# Patient Record
Sex: FEMALE | Race: WHITE | Employment: OTHER | URBAN - METROPOLITAN AREA
[De-identification: names, ages, dates, MRNs, and addresses within clinical notes are randomized per-mention and may not be internally consistent; named-entity substitution may affect disease eponyms.]

---

## 2017-01-03 ENCOUNTER — CARE COORDINATION (OUTPATIENT)
Dept: SURGERY | Facility: CLINIC | Age: 72
End: 2017-01-03

## 2017-01-03 DIAGNOSIS — K81.0 ACUTE CHOLECYSTITIS: Primary | ICD-10-CM

## 2017-01-03 RX ORDER — HYDROMORPHONE HYDROCHLORIDE 2 MG/1
1-2 TABLET ORAL EVERY 4 HOURS PRN
Qty: 10 TABLET | Refills: 0 | Status: SHIPPED | OUTPATIENT
Start: 2017-01-03 | End: 2017-04-12

## 2017-01-03 NOTE — PROGRESS NOTES
RN Post Op Care Coordination Note    Ms. Patricia A Sowada is a 71 year old female who was hospitalized with cholecystitis and discharged . Spoke with Patient.    Support  Patient able to care for self independently     Health Status  Fevers/chills: Patient denies any fever or chills.  Nausea/Vomiting: Patient denies nausea/vomiting.  Did not fill Zofran Rx.  Eating/drinking: Patient is able to eat and drink without any complaints.  Discussed no fat/low fat diet.  Bowel habits: Patient reports having a normal bowel movement.  Urinary: Voiding normally  Drains (VIRGIL): N/A  Incisions: No surgery  Pain: Improving.  Taking Tylenol PRN and Dilaudid 1 mg BID PRN with good pain management.  Patients spouse passed away while she was hospitalized and wake/ this weekend.  She is asking for Dilaudid refill due to increased activity.  Antibiotic:  2 doses of Cipro remaining.    Activity/Restrictions  Patient reports return to normal activities.  Balancing rest with activity.    Equipment  other None    Pathology reviewed with patient:  N/A    All of her questions were answered.  She will call this office if she has any further questions and/or concerns.      Post hospital visit confirmed with Dr. Reyes.    Whom and When to Call  Patient acknowledges understanding of how to manage any medication changes and when to seek medical care.     Patient advised that if after hour medical concerns arise to please call [phone] and choose option 4 to speak to the physician on call.

## 2017-01-16 ENCOUNTER — TRANSFERRED RECORDS (OUTPATIENT)
Dept: HEALTH INFORMATION MANAGEMENT | Facility: CLINIC | Age: 72
End: 2017-01-16

## 2017-03-23 ENCOUNTER — TRANSFERRED RECORDS (OUTPATIENT)
Dept: HEALTH INFORMATION MANAGEMENT | Facility: CLINIC | Age: 72
End: 2017-03-23

## 2017-04-12 ENCOUNTER — OFFICE VISIT (OUTPATIENT)
Dept: FAMILY MEDICINE | Facility: CLINIC | Age: 72
End: 2017-04-12
Payer: MEDICARE

## 2017-04-12 ENCOUNTER — RADIANT APPOINTMENT (OUTPATIENT)
Dept: GENERAL RADIOLOGY | Facility: CLINIC | Age: 72
End: 2017-04-12
Attending: FAMILY MEDICINE
Payer: MEDICARE

## 2017-04-12 VITALS
HEIGHT: 68 IN | WEIGHT: 171.13 LBS | HEART RATE: 72 BPM | SYSTOLIC BLOOD PRESSURE: 134 MMHG | TEMPERATURE: 98.3 F | DIASTOLIC BLOOD PRESSURE: 84 MMHG | BODY MASS INDEX: 25.93 KG/M2

## 2017-04-12 DIAGNOSIS — K81.1 CHRONIC CHOLECYSTITIS: ICD-10-CM

## 2017-04-12 DIAGNOSIS — I10 HYPERTENSION GOAL BP (BLOOD PRESSURE) < 140/90: ICD-10-CM

## 2017-04-12 DIAGNOSIS — Z01.818 PREOP GENERAL PHYSICAL EXAM: ICD-10-CM

## 2017-04-12 DIAGNOSIS — Z01.818 PREOP GENERAL PHYSICAL EXAM: Primary | ICD-10-CM

## 2017-04-12 DIAGNOSIS — I44.4 LAFB (LEFT ANTERIOR FASCICULAR BLOCK): ICD-10-CM

## 2017-04-12 PROCEDURE — 71020 XR CHEST 2 VW: CPT

## 2017-04-12 PROCEDURE — 99214 OFFICE O/P EST MOD 30 MIN: CPT | Performed by: FAMILY MEDICINE

## 2017-04-12 PROCEDURE — 93000 ELECTROCARDIOGRAM COMPLETE: CPT | Performed by: FAMILY MEDICINE

## 2017-04-12 NOTE — MR AVS SNAPSHOT
After Visit Summary   4/12/2017    Patricia A Sowada    MRN: [id number]           Patient Information     Date Of Birth          1945        Visit Information        Provider Department      4/12/2017 11:20 AM Trae Medina DO [facility]        Today's Diagnoses     Preop general physical exam    -  1    Hypertension goal BP (blood pressure) < 140/90        LAFB (left anterior fascicular block)          Care Instructions    Get stress done as planned  I am waiting records from [facility]   Will fax paperwork when done with chart and send one copy to you as well    Before Your Surgery      Call your surgeon if there is any change in your health. This includes signs of a cold or flu (such as a sore throat, runny nose, cough, rash or fever).    Do not smoke, drink alcohol or take over the counter medicine (unless your surgeon or primary care doctor tells you to) for the 24 hours before and after surgery.    If you take prescribed drugs: Follow your doctor s orders about which medicines to take and which to stop until after surgery.    Eating and drinking prior to surgery: follow the instructions from your surgeon    Take a shower or bath the night before surgery. Use the soap your surgeon gave you to gently clean your skin. If you do not have soap from your surgeon, use your regular soap. Do not shave or scrub the surgery site.  Wear clean pajamas and have clean sheets on your bed.         Follow-ups after your visit        Your next 10 appointments already scheduled     Apr 18, 2017 11:00 AM CDT   Ech Stress Test with FKECHR1, FK STRESS RM   [facility] ([facility])    [address] [place] [id number] [phone]           1.  Please bring or wear a comfortable two-piece outfit and walking shoes. 2.  Stop eating 3 hours before the test. You may drink water or juice. 3.  Stop all caffeine 12 hours before  the test. This includes coffee, tea, soda pop, chocolate and certain medicines (such as Anacin and Excederin). Also avoid decaf coffee and tea, as these contain small amounts of caffeine. 4.  No alcohol, smoking or use of other tobacco products for 12 hours before the test. 5.  Refer to your provider instructions to see if you need to stop any medications (such as beta-blockers or nitrates) for this test. 6.  For patients with diabetes: -   If you take insulin, call your diabetes care team. Ask if you should take a   dose the morning of your test. -   If you take diabetes medicine by mouth, don't take it on the morning of your test. Bring it with you to take after the test.  (If you have questions, call your diabetes care team) 7.  When you arrive, please tell us if: -   You have diabetes. -   You have taken Viagra, Cialis or Levitra in the past 48 hours. 8.  For any questions that cannot be answered, please contact the ordering physician              Future tests that were ordered for you today     Open Future Orders        Priority Expected Expires Ordered    Exercise Stress Echocardiogram Routine  4/12/2018 4/12/2017            Who to contact     If you have questions or need follow up information about today's clinic visit or your schedule please contact [facility] directly at [phone].  Normal or non-critical lab and imaging results will be communicated to you by MyChart, letter or phone within 4 business days after the clinic has received the results. If you do not hear from us within 7 days, please contact the clinic through MyChart or phone. If you have a critical or abnormal lab result, we will notify you by phone as soon as possible.  Submit refill requests through [employer] or call your pharmacy and they will forward the refill request to us. Please allow 3 business days for your refill to be completed.          Additional Information About Your Visit        [employer] Information      "[employer]suite lets you send messages to your doctor, view your test results, renew your prescriptions, schedule appointments and more. To sign up, go to [email or web address] . Click on \"Log in\" on the left side of the screen, which will take you to the Welcome page. Then click on \"Sign up Now\" on the right side of the page.     You will be asked to enter the access code listed below, as well as some personal information. Please follow the directions to create your username and password.     Your access code is: [email or web address]  Expires: 2017  7:30 AM     Your access code will  in 90 days. If you need help or a new code, please call your [facility] clinic or [phone].        Care EveryWhere ID     This is your [facility] EveryWhere ID. This could be used by other organizations to access your [facility] medical records  [id number]        Your Vitals Were     Pulse Temperature Height BMI (Body Mass Index)          72 98.3  F (36.8  C) (Oral) 5' 8\" (1.727 m) 26.02 kg/m2         Blood Pressure from Last 3 Encounters:   17 134/84   16 133/60   16 170/86    Weight from Last 3 Encounters:   17 171 lb 2 oz (77.6 kg)   16 182 lb 4.8 oz (82.7 kg)   11/10/16 177 lb (80.3 kg)              We Performed the Following     EKG 12-lead complete w/read - Clinics          Today's Medication Changes          These changes are accurate as of: 17  1:02 PM.  If you have any questions, ask your nurse or doctor.               Stop taking these medicines if you haven't already. Please contact your care team if you have questions.     acetaminophen 325 MG tablet   Commonly known as:  TYLENOL   Stopped by:  Trae Medina DO                    Primary Care Provider Office Phone # Fax #    Trae Medina [phone]       [address] [address]        Thank you!     Thank you for choosing [facility]  for " your care. Our goal is always to provide you with excellent care. Hearing back from our patients is one way we can continue to improve our services. Please take a few minutes to complete the written survey that you may receive in the mail after your visit with us. Thank you!             Your Updated Medication List - Protect others around you: Learn how to safely use, store and throw away your medicines at www.disposemymeds.org.          This list is accurate as of: 4/12/17  1:02 PM.  Always use your most recent med list.                   Brand Name Dispense Instructions for use    aspirin 81 MG EC tablet      Take 81 mg by mouth daily       hydrochlorothiazide 12.5 MG Tabs tablet     90 tablet    Take 1 tablet (12.5 mg) by mouth daily       latanoprost 0.005 % ophthalmic solution    XALATAN     Place 1 drop into both eyes At Bedtime

## 2017-04-12 NOTE — PATIENT INSTRUCTIONS
Get stress done as planned  I am waiting records from [facility]   Will fax paperwork when done with chart and send one copy to you as well    Before Your Surgery      Call your surgeon if there is any change in your health. This includes signs of a cold or flu (such as a sore throat, runny nose, cough, rash or fever).    Do not smoke, drink alcohol or take over the counter medicine (unless your surgeon or primary care doctor tells you to) for the 24 hours before and after surgery.    If you take prescribed drugs: Follow your doctor s orders about which medicines to take and which to stop until after surgery.    Eating and drinking prior to surgery: follow the instructions from your surgeon    Take a shower or bath the night before surgery. Use the soap your surgeon gave you to gently clean your skin. If you do not have soap from your surgeon, use your regular soap. Do not shave or scrub the surgery site.  Wear clean pajamas and have clean sheets on your bed.

## 2017-04-12 NOTE — PROGRESS NOTES
[address] [id number] [phone]  Dept: [phone]    PRE-OP EVALUATION:  Today's date: 2017    Patricia A Sowada (: 1945) presents for pre-operative evaluation assessment as requested by Dr. Arora.  She requires evaluation and anesthesia risk assessment prior to undergoing surgery/procedure for treatment of gall stones ? .  Proposed procedure: gall bladder removal    Date of Surgery/ Procedure: 17  Time of Surgery/ Procedure: ?  Hospital/Surgical Facility: [facility]  Fax number for surgical facility: [phone]  Primary Physician: Trae Medina  Type of Anesthesia Anticipated: General    Patient has a Health Care Directive or Living Will:  YES      1. NO - Do you have a history of heart attack, stroke, stent, bypass or surgery on an artery in the head, neck, heart or legs?  2. NO - Do you ever have any pain or discomfort in your chest?  3. NO - Do you have a history of  Heart Failure?  4. NO - Are you troubled by shortness of breath when: walking on the level, up a slight hill or at night?  5. NO - Do you currently have a cold, bronchitis or other respiratory infection?  6. NO - Do you have a cough, shortness of breath or wheezing?  7. NO - Do you sometimes get pains in the calves of your legs when you walk?  8. NO - Do you or anyone in your family have previous history of blood clots?  9. NO - Do you or does anyone in your family have a serious bleeding problem such as prolonged bleeding following surgeries or cuts?  10. NO - Have you ever had problems with anemia or been told to take iron pills?  11. NO - Have you had any abnormal blood loss such as black, tarry or bloody stools, or abnormal vaginal bleeding?  12. NO - Have you ever had a blood transfusion?  13. YES - Have you or any of your relatives ever had problems with anesthesia?  14. NO - Do you have sleep apnea, excessive snoring or daytime drowsiness?  15.  NO - Do you have any prosthetic heart valves?  16. NO - Do you have prosthetic joints?  17. NO - Is there any chance that you may be pregnant?      HPI:                                                      Brief HPI related to upcoming procedure: history of acute cholecystitis, was on antibiotics in 12/16, she was supposed to follow up up at the [facility] and was worsening with her pain and fever and went to [place].  Patient had percutaneous cholecystostomy placed for 3 months and is to have her gallbladder removed.    She has no fever, no chills, no abdominal pain, no cough, no sob, no chest pain, no headache, no dizziness.      no records from [facility] here to review     History of hypertension-taking meds, no previous EKG done here, no symptoms with 4 mets of exercise, EKG showing no acute problems but shows anterior fasicular Block    MEDICAL HISTORY:                                                      Patient Active Problem List    Diagnosis Date Noted     Advanced directives, counseling/discussion 10/13/2011     Priority: High     Advance Care Planning:   Receipt of ACP document:  Received: Health Care Directive which was witnessed or notarized on 2/16/13.  Document not previously scanned.  Validation form completed and sent with document to be scanned.  Code Status needs to be updated to reflect choices in most recent ACP document. Orders placed.  Confirmed/documented designated decision maker(s). See permanent comments section of demographics in clinical tab. View document(s) and details by clicking on code status.   Added by Darci Teran on 5/1/2013.         Acute cholecystitis 12/18/2016     Priority: Medium     Uterine leiomyoma, unspecified location 11/10/2015     Priority: Medium     Diverticular disease of colon 10/13/2014     Priority: Medium     Morning joint stiffness 10/24/2013     Priority: Medium     Colon polyps 10/15/2012     Priority: Medium     At next colonoscopy, needs to be done in hospital  due to anesthesia reactions.        Seborrheic keratosis 03/14/2011     Priority: Medium     (Problem list name updated by automated process. Provider to review and confirm.)       Hypertension goal BP (blood pressure) < 140/90 09/02/2010     Priority: Medium     CARDIOVASCULAR SCREENING; LDL GOAL LESS THAN 130 05/09/2010     Priority: Medium     Overweight 02/26/2010     Priority: Medium     Problem list name updated by automated process. Provider to review       Impaired fasting glucose 11/25/2008     Priority: Medium      Past Medical History:   Diagnosis Date     Bell's palsy 10/2001     COPD (chronic obstructive pulmonary disease) (H)      HTN (hypertension)      PONV (postoperative nausea and vomiting)      Past Surgical History:   Procedure Laterality Date     BUNIONECTOMY RT/LT  6/2001    right     C APPENDECTOMY  1950     C NONSPECIFIC PROCEDURE  1996    hammer toe repair, bilat     COLONOSCOPY N/A 10/9/2015    Procedure: COLONOSCOPY;  Surgeon: Aldo Hurtado MD;  Location:  GI     TUBAL LIGATION  1980     Current Outpatient Prescriptions   Medication Sig Dispense Refill     aspirin 81 MG EC tablet Take 81 mg by mouth daily       latanoprost (XALATAN) 0.005 % ophthalmic solution Place 1 drop into both eyes At Bedtime  0     hydrochlorothiazide 12.5 MG TABS Take 1 tablet (12.5 mg) by mouth daily 90 tablet 3     OTC products: None, except as noted above    Allergies   Allergen Reactions     Penicillins Rash      Latex Allergy: NO    Social History   Substance Use Topics     Smoking status: Never Smoker     Smokeless tobacco: Never Used     Alcohol use No     History   Drug Use No       REVIEW OF SYSTEMS:                                                    C: NEGATIVE for fever, chills, change in weight  I: NEGATIVE for worrisome rashes, moles or lesions  E: NEGATIVE for vision changes or irritation  E/M: NEGATIVE for ear, mouth and throat problems  R: NEGATIVE for significant cough or SOB  B:  "NEGATIVE for masses, tenderness or discharge  CV: NEGATIVE for chest pain, palpitations or peripheral edema  GI: NEGATIVE for nausea, abdominal pain, heartburn, or change in bowel habits  : NEGATIVE for frequency, dysuria, or hematuria  M: NEGATIVE for significant arthralgias or myalgia  N: NEGATIVE for weakness, dizziness or paresthesias  E: NEGATIVE for temperature intolerance, skin/hair changes  H: NEGATIVE for bleeding problems  P: NEGATIVE for changes in mood or affect    EXAM:                                                    /84 (BP Location: Right arm, Cuff Size: Adult Large)  Pulse 72  Temp 98.3  F (36.8  C) (Oral)  Ht 5' 8\" (1.727 m)  Wt 171 lb 2 oz (77.6 kg)  BMI 26.02 kg/m2    GENERAL APPEARANCE: healthy, alert and no distress     EYES: EOMI, PERRL     HENT: ear canals and TM's normal and nose and mouth without ulcers or lesions     NECK: no adenopathy, no asymmetry, masses, or scars and thyroid normal to palpation     RESP: lungs clear to auscultation - no rales, rhonchi or wheezes     CV: regular rates and rhythm, normal S1 S2, no S3 or S4 and no murmur, click or rub     ABDOMEN:  soft, nontender, no HSM or masses and bowel sounds normal     MS: extremities normal- no gross deformities noted, no evidence of inflammation in joints, FROM in all extremities.     SKIN: no suspicious lesions or rashes     NEURO: Normal strength and tone, sensory exam grossly normal, mentation intact and speech normal    DIAGNOSTICS:                                                    EKG: appears normal, NSR, no previous EKG , anterior fascicular block    Recent Labs   Lab Test  12/25/16   0720  12/24/16   0738   04/02/15   0920   02/12/09   0732   HGB  12.4  13.2   < >  15.0   < >   --    PLT  340  311   < >   --    --    --    NA  139  139   < >  142   < >   --    POTASSIUM  3.8  3.7   < >  4.0   < >   --    CR  0.61  0.62   < >  0.64   < >   --    A1C   --    --    --   5.4   --   5.3    < > = values in this " interval not displayed.        IMPRESSION:                                                    Reason for surgery/procedure: gallbladder removal  Diagnosis/reason for consult: preoperative visit    The proposed surgical procedure is considered INTERMEDIATE risk.    REVISED CARDIAC RISK INDEX  The patient has the following serious cardiovascular risks for perioperative complications such as (MI, PE, VFib and 3  AV Block):  No serious cardiac risks  INTERPRETATION: 0 risks: Class I (very low risk - 0.4% complication rate)    The patient has the following additional risks for perioperative complications:  No identified additional risks      ICD-10-CM    1. Preop general physical exam Z01.818 EKG 12-lead complete w/read - Clinics     XR Chest 2 Views     CANCELED: EKG 12-lead complete w/read - Clinics   2. Chronic cholecystitis K81.1    3. Hypertension goal BP (blood pressure) < 140/90 I10 XR Chest 2 Views     Exercise Stress Echocardiogram   4. LAFB (left anterior fascicular block) I44.4 Exercise Stress Echocardiogram     hypertension-stable, cont meds  Chronic cholecystitis up with surgery as planned, patient declined labs today as she reports that she had it at [facility] few days ago and was told it was normal.  Chest x-ray neg  EKG changes-stress echo advised    RECOMMENDATIONS:                                                      --Consult hospital rounder / IM to assist post-op medical management    --Patient is to take all scheduled medications on the day of surgery EXCEPT for modifications listed below.    APPROVAL GIVEN to proceed with proposed procedure, without further diagnostic evaluation       Signed Electronically by: Trae Medina DO    Copy of this evaluation report is provided to requesting physician.    [facility] Preop Guidelines

## 2017-04-12 NOTE — NURSING NOTE
"Chief Complaint   Patient presents with     Pre-Op Exam       Initial /84 (BP Location: Right arm, Cuff Size: Adult Large)  Pulse 72  Temp 98.3  F (36.8  C) (Oral)  Ht 5' 8\" (1.727 m)  Wt 171 lb 2 oz (77.6 kg)  BMI 26.02 kg/m2 Estimated body mass index is 26.02 kg/(m^2) as calculated from the following:    Height as of this encounter: 5' 8\" (1.727 m).    Weight as of this encounter: 171 lb 2 oz (77.6 kg).  Medication Reconciliation: rhonda UNGER, Certified Medical Assistant (AAMA)April 12, 2017 11:14 AM      "

## 2017-04-17 ENCOUNTER — TELEPHONE (OUTPATIENT)
Dept: FAMILY MEDICINE | Facility: CLINIC | Age: 72
End: 2017-04-17

## 2017-04-18 ENCOUNTER — TELEPHONE (OUTPATIENT)
Dept: FAMILY MEDICINE | Facility: CLINIC | Age: 72
End: 2017-04-18

## 2017-04-18 ENCOUNTER — RADIANT APPOINTMENT (OUTPATIENT)
Dept: CARDIOLOGY | Facility: CLINIC | Age: 72
End: 2017-04-18
Attending: FAMILY MEDICINE
Payer: MEDICARE

## 2017-04-18 DIAGNOSIS — I10 HYPERTENSION GOAL BP (BLOOD PRESSURE) < 140/90: ICD-10-CM

## 2017-04-18 DIAGNOSIS — I44.4 LAFB (LEFT ANTERIOR FASCICULAR BLOCK): ICD-10-CM

## 2017-04-18 PROCEDURE — 40000264 ECHO STRESS TEST WITH DEFINITY: Performed by: INTERNAL MEDICINE

## 2017-04-18 PROCEDURE — 93321 DOPPLER ECHO F-UP/LMTD STD: CPT | Mod: TC | Performed by: INTERNAL MEDICINE

## 2017-04-18 PROCEDURE — 93350 STRESS TTE ONLY: CPT | Mod: 26 | Performed by: INTERNAL MEDICINE

## 2017-04-18 PROCEDURE — 93017 CV STRESS TEST TRACING ONLY: CPT | Performed by: INTERNAL MEDICINE

## 2017-04-18 PROCEDURE — 93352 ADMIN ECG CONTRAST AGENT: CPT | Performed by: INTERNAL MEDICINE

## 2017-04-18 PROCEDURE — 93016 CV STRESS TEST SUPVJ ONLY: CPT | Performed by: INTERNAL MEDICINE

## 2017-04-18 PROCEDURE — 93018 CV STRESS TEST I&R ONLY: CPT | Performed by: INTERNAL MEDICINE

## 2017-04-18 PROCEDURE — 93325 DOPPLER ECHO COLOR FLOW MAPG: CPT | Mod: 26 | Performed by: INTERNAL MEDICINE

## 2017-04-18 PROCEDURE — 93325 DOPPLER ECHO COLOR FLOW MAPG: CPT | Mod: TC | Performed by: INTERNAL MEDICINE

## 2017-04-18 PROCEDURE — 93321 DOPPLER ECHO F-UP/LMTD STD: CPT | Mod: 26 | Performed by: INTERNAL MEDICINE

## 2017-04-18 PROCEDURE — 93350 STRESS TTE ONLY: CPT | Mod: TC | Performed by: INTERNAL MEDICINE

## 2017-04-18 RX ADMIN — Medication 6 ML: at 11:15

## 2017-04-18 NOTE — TELEPHONE ENCOUNTER
Attempt # 1    Called patient at home number.     Was call answered?  No.  Left message on voicemail with information to call me back.    Kelechi Luong RN

## 2017-04-18 NOTE — TELEPHONE ENCOUNTER
Call and notify patient her stress test is normal. The results should also be sent with her preop to [facility].     See recent encounters that may have sent the preop already. Unsure if stress test was sent. If not please send.     Avtar Mccracken MD

## 2017-04-19 NOTE — TELEPHONE ENCOUNTER
Faxed stress test results to [facility] at [phone].    Pre op had already been faxed.    Denise Simental

## 2017-04-19 NOTE — TELEPHONE ENCOUNTER
Called and spoke to  Myla, relayed message.     Team please fax this to [facility].     Hetal Buchanan RN

## 2017-06-28 ENCOUNTER — TELEPHONE (OUTPATIENT)
Dept: FAMILY MEDICINE | Facility: CLINIC | Age: 72
End: 2017-06-28

## 2017-06-28 NOTE — TELEPHONE ENCOUNTER
Patricia A Sowada is a 72 year old female who calls with complaint of blood in her urine..    NURSING ASSESSMENT:  Description:  Noticed a little blood in her urine this morning. Has not had any blood in her urine this afternoon.  Onset/duration:  This morning  Precip. factors:  Had a davis catheter in for her surgery on 6/23/17  Associated symptoms:  Denies urgency, pain, or fever.  Improves/worsens symptoms:  Drinking water helps.  Pain scale (0-10)   0/10  LMP/preg/breast feeding:  na  Last exam/Treatment:  4/12/17  Allergies:   Allergies   Allergen Reactions     Penicillins Rash       MEDICATIONS:   Taking medication(s) as prescribed? Yes  Taking over the counter medication(s?) No  Any medication side effects? Not Applicable    Any barriers to taking medication(s) as prescribed?  No  Medication(s) improving/managing symptoms?  N/A  Medication reconciliation completed: Yes      NURSING PLAN: Nursing advice to patient Increase fluid intake. Drink some cranberry juice also. If symtoms do not improve or if you develope pain or fever call to schedule an appointment with a provider.    RECOMMENDED DISPOSITION:  Home care advice - see above  Will comply with recommendation: Yes  If further questions/concerns or if symptoms do not improve, worsen or new symptoms develop, call your PCP or [facility] as soon as possible.      Guideline used:  Telephone Triage Protocols for Nurses, Fifth Edition, Carmelita Salcedo RN

## 2017-06-28 NOTE — TELEPHONE ENCOUNTER
Reason for call:  Patient reporting a symptom    Symptom or request: blood in urine, patient had wrist surgery at [facility] on 6/23 in the AM, patient states they emptied her bladder. Patient states today she had blood in urine, was told to call PCP.    Duration (how long have symptoms been present): x 1 today    Have you been treated for this before? Yes    Additional comments:     Phone Number patient can be reached at:  Home number on file [phone] (home)    Best Time:  any    Can we leave a detailed message on this number:  YES    Call taken on 6/28/2017 at 12:35 PM by Laura Abdi

## 2017-08-02 ENCOUNTER — THERAPY VISIT (OUTPATIENT)
Dept: OCCUPATIONAL THERAPY | Facility: CLINIC | Age: 72
End: 2017-08-02
Payer: MEDICARE

## 2017-08-02 DIAGNOSIS — Z47.89 AFTERCARE FOLLOWING SURGERY OF THE MUSCULOSKELETAL SYSTEM: ICD-10-CM

## 2017-08-02 DIAGNOSIS — S52.502D CLOSED FRACTURE OF DISTAL END OF LEFT RADIUS WITH ROUTINE HEALING, UNSPECIFIED FRACTURE MORPHOLOGY, SUBSEQUENT ENCOUNTER: ICD-10-CM

## 2017-08-02 DIAGNOSIS — M25.632 STIFFNESS OF LEFT WRIST JOINT: Primary | ICD-10-CM

## 2017-08-02 PROCEDURE — G8988 SELF CARE GOAL STATUS: HCPCS | Mod: GO | Performed by: OCCUPATIONAL THERAPIST

## 2017-08-02 PROCEDURE — 97110 THERAPEUTIC EXERCISES: CPT | Mod: GO | Performed by: OCCUPATIONAL THERAPIST

## 2017-08-02 PROCEDURE — G8987 SELF CARE CURRENT STATUS: HCPCS | Mod: GO | Performed by: OCCUPATIONAL THERAPIST

## 2017-08-02 PROCEDURE — 97165 OT EVAL LOW COMPLEX 30 MIN: CPT | Mod: GO | Performed by: OCCUPATIONAL THERAPIST

## 2017-08-02 PROCEDURE — 97140 MANUAL THERAPY 1/> REGIONS: CPT | Mod: GO | Performed by: OCCUPATIONAL THERAPIST

## 2017-08-02 NOTE — MR AVS SNAPSHOT
After Visit Summary   8/2/2017    Patricia A Sowada    MRN: [id number]           Patient Information     Date Of Birth          1945        Visit Information        Provider Department      8/2/2017 1:00 PM Ifrah Preciado [facility] [place]        Today's Diagnoses     Stiffness of left wrist joint    -  1    Closed fracture of distal end of left radius with routine healing, unspecified fracture morphology, subsequent encounter        Aftercare following surgery of the musculoskeletal system           Follow-ups after your visit        Your next 10 appointments already scheduled     Aug 08, 2017 10:00 AM CDT   SUDHAKAR Hand with Ifrah Preciado   [facility] [place] ([facility] [place] [facility])    [address]  [place] [address]   [phone]            Aug 10, 2017 10:00 AM CDT   SUDHAKAR Hand with Ifrah Preciado   [facility] [place] ([facility] [place] [facility])    [address]  [address]  [place] [address]   [phone]            Aug 14, 2017 11:30 AM CDT   SUDHAKAR Hand with Ifrah Preciado   [facility] [place] ([facility] [place] [facility])    [address]  [address]  [place] [address]   [phone]            Aug 17, 2017 11:30 AM CDT   SUDHAKAR Hand with Ifrah Preciado   [facility] [place] ([facility] [place] [facility])    [address]  [place] [address]   [phone]            Aug 28, 2017  7:30 AM CDT   SUDHAKAR Hand with Nicolle Pastor, OT   [facility] [place] ([facility] [place] [facility])    [address]  [place] [address]   [phone]            Aug 31, 2017  7:30 AM CDT   SUDHAKAR Hand with Nicolle Pastor, OT   [facility] [place] ([facility] [place] [facility])    [address]  [address]  [place]  "[id number] [phone]              Who to contact     If you have questions or need follow up information about today's clinic visit or your schedule please contact [facility] directly at [phone].  Normal or non-critical lab and imaging results will be communicated to you by MyChart, letter or phone within 4 business days after the clinic has received the results. If you do not hear from us within 7 days, please contact the clinic through MyChart or phone. If you have a critical or abnormal lab result, we will notify you by phone as soon as possible.  Submit refill requests through [employer] or call your pharmacy and they will forward the refill request to us. Please allow 3 business days for your refill to be completed.          Additional Information About Your Visit        Jajahhart Information     [employer] lets you send messages to your doctor, view your test results, renew your prescriptions, schedule appointments and more. To sign up, go to [email or web address] . Click on \"Log in\" on the left side of the screen, which will take you to the Welcome page. Then click on \"Sign up Now\" on the right side of the page.     You will be asked to enter the access code listed below, as well as some personal information. Please follow the directions to create your username and password.     Your access code is: [email or web address]  Expires: 10/31/2017  3:28 PM     Your access code will  in 90 days. If you need help or a new code, please call your [place] clinic or [phone].        Care EveryWhere ID     This is your Care EveryWhere ID. This could be used by other organizations to access your [place] medical records  [id number]         Blood Pressure from Last 3 Encounters:   17 134/84   16 133/60   16 170/86    Weight from Last 3 Encounters:   17 77.6 kg (171 lb 2 oz)   16 82.7 kg (182 lb 4.8 oz)   11/10/16 80.3 kg (177 lb)            "   We Performed the Following     HC OT EVAL, LOW COMPLEXITY     SUDHAKAR CERT REPORT     SUDHAKAR INITIAL EVAL REPORT     MANUAL THER TECH,1+REGIONS,EA 15 MIN     THERAPEUTIC EXERCISES        Primary Care Provider Office Phone # Fax #    Trae Medina [phone]       [facility] [address] [address]        Equal Access to Services     LIZETT JOSEPH : Hadii aad ku hadasho Soomaali, waaxda luqadaha, qaybta kaalmada adeegyada, waxay idiin hayaan adeeg kharash la'aan ah. So [facility] [phone].    ATENCIÓN: Si habla español, tiene a espinosa disposición servicios gratuitos de asistencia lingüística. Tyroneame al [phone].    We comply with applicable federal civil rights laws and [place] laws. We do not discriminate on the basis of race, color, national origin, age, disability sex, sexual orientation or gender identity.            Thank you!     Thank you for choosing [facility]  for your care. Our goal is always to provide you with excellent care. Hearing back from our patients is one way we can continue to improve our services. Please take a few minutes to complete the written survey that you may receive in the mail after your visit with us. Thank you!             Your Updated Medication List - Protect others around you: Learn how to safely use, store and throw away your medicines at www.disposemymeds.org.          This list is accurate as of: 8/2/17  3:28 PM.  Always use your most recent med list.                   Brand Name Dispense Instructions for use Diagnosis    aspirin 81 MG EC tablet      Take 81 mg by mouth daily        hydrochlorothiazide 12.5 MG Tabs tablet     90 tablet    Take 1 tablet (12.5 mg) by mouth daily    Hypertension goal BP (blood pressure) < 140/90       latanoprost 0.005 % ophthalmic solution    XALATAN     Place 1 drop into both eyes At Bedtime

## 2017-08-02 NOTE — LETTER
DEPARTMENT OF HEALTH AND HUMAN SERVICES  CENTERS FOR MEDICARE & MEDICAID SERVICES    PLAN/UPDATED PLAN OF PROGRESS FOR OUTPATIENT REHABILITATION    PATIENTS NAME:  Sowada, Patricia   : 1945  PROVIDER NUMBER:  [id number]  [facility] [id number]  PROVIDER NAME: [facility]  MEDICAL RECORD NUMBER: [id number]   START OF CARE DATE:    SOC Date: 17   TYPE:  OT  PRIMARY/TREATMENT DIAGNOSIS: (Pertinent Medical Diagnosis)  Stiffness of left wrist joint  Closed fracture of distal end of left radius with routine healing, unspecified fracture morphology, subsequent encounter  Aftercare following surgery of the musculoskeletal system  VISITS FROM START OF CARE:  Rxs Used: 1     Hand Therapy Initial Evaluation  Current Date:  2017    Subjective:  Patricia A Sowada is a 72 year old right hand dominant female.  Diagnosis:   Left distal radius fracture  DOI:  17  DOS:  17  Procedure:  ORIF  Post:  5w 5d  Next MD f/u 17; Dr. Elias at [place]  Patient reports symptoms of pain, stiffness/loss of motion, weakness/loss of strength and edema of the left hand and wrist which occurred due to FOOSH when missed step on ladder. Since onset symptoms are gradually getting better.  Special tests:  x-ray.  Previous treatment: Casted until 17 now munster orthosis.  General health as reported by patient is excellent.  Pertinent medical history includes:none  Medical allergies:Penicillins.  Surgical history: orthopedic: recent wrist ORIF.  Medication history: Hydrochlorot, Latanoprost.    Occupational Profile Information:  Current occupation is Retired   Currently not working due to school not being in session  Prior functional level:  no limitations  Barriers include:none  Mobility: No difficulty  Transportation: drives  Leisure activities/hobbies: Reading, play piano, knitting and crocheting   Other: Subsitute teacher   Functional Outcome Measure:  Upper Extremity Functional  Index  SCORE:   Column Totals:   (A lower score indicates greater disability.)      PATIENTS NAME:  Sowada, Patricia   : 1945    ROM:  Mild limitation of fingers to DPC, 2 cm  Thumb  17   AROM(PROM) Right Left   MP /40 /40   IP /50 /12   PAbd 40 35   RAbd 35 25     Wrist  17   AROM(PROM) Right Left   Extension 65 25   Flexion 75 15   RD 20 5   UD 35 10   Supination 80 35   Pronation 80 60   Strength:   (Measured in pounds)    17   Trials Right Left   1  2  3 48  50  45 10  8  8   Average: 48 9     Lat Pinch  17   Trials Right Left   1  2  3 13  14  13 4  4  4   Average: 13 4     3 Pt Pinch  17   Trials Right Left   1  2  3 11  10  10 2  3  3   Average: 10 3   Edema:  Circumference:  (Measured in cm)  Wrist Crease 17   Right 15.2   Left 17.2   Scar:  Sensitivity: Mild Quality:  Moderate  Sensation:  WNL throughout all nerve distributions; per patient report  Pain Report:  VAS(0-10) 17   At Rest: 0/10   With Use: 3/10   Location:  Ulnar styloid  Pain Quality:  Dull and pressure  Frequency: intermittent    PATIENTS NAME:  Sowada, Patricia   : 1945  Pain is worst:  daytime  Exacerbated by:  Dependent position, pressure to bone  Relieved by:  Remove splint  Progression:  Gradually improving  Assessment:  Patient presents with symptoms consistent with diagnosis of distal radius fracture, with surgical intervention. Patient's limitations or Problem List includes:  Pain, Decreased ROM/motion, Increased edema, Weakness, Adherent scarring, Decreased  and Decreased pinch of the left wrist and hand which interferes with the patient's ability to perform Self Care Tasks (dressing, eating, bathing, hygiene/toileting), Work Tasks, Sleep Patterns, Recreational Activities, Household Chores and Driving  as compared to previous level of function.Rehab Potential:  Good - Return to full activity, some limitationsPatient will benefit from skilled Occupational Therapy to increase  ROM, flexibility, overall strength,  strength and pinch strength and decrease pain, edema and adherence of scarring to return to previous activity level and resume normal daily tasks and to reach their rehab potential.  Barriers to Learning:  No barrier  Communication Issues:  Patient appears to be able to clearly communicate and understand verbal and written communication and follow directions correctly.  Chart Review: Chart Review, Brief history including review of medical and/or therapy records relating to the presenting problem and Detailed history review with patient  Identified Performance Deficits: bathing/showering, toileting, dressing, feeding, hygiene and grooming, driving and community mobility, home establishment and management, meal preparation and cleanup, shopping, sleep, work and leisure activities    Assessment of Occupational Performance:  5 or more Performance Deficits  Clinical Decision Making (Complexity): Low complexity  Treatment Explanation:  The following has been discussed with the patient:  RX ordered/plan of care  Anticipated outcomes  Possible risks and side effects    Plan:  Frequency:  2 X week, once daily  Duration:  for 2 weeks tapering to 1 X a week over 6 weeks  Treatment Plan:      Modalities:    US and Paraffin   Therapeutic Exercise:   AROM of fingers, thumb, wrist and forearm  PROM of wrist E/F and P/S   Overhead fisting to control edema  Progress to  and Pinch strengthening  Progress to Wrist Isotonic strengthening  Manual Techniques:  PATIENTS NAME:  Sowada, Patricia   : 1945   Scar mobilization  Joint mobilization techniques   Manual Edema Mobilization   Kinesiotape  Orthotic Fabrication:    Forearm based munster orthosis (fit at [place] 2 weeks ago)    Discharge Plan:    Achieve all LTG.  Independent in home treatment program.  Reach maximal therapeutic benefit.  Home Program:   Warmth for stiffness  Scar mobilization circular and 3 vector  AROM fingers 3  "fists  AROM Thumb E/F and opposition  [place] orthosis, remove for exercise and hygiene  Encourage light functional use of left hand out of orthosis  Next Visit:  Add PROM for wrist and  strengthening  Progress to joint mobs and wrist isotonics         Caregiver Signature/Credentials ______________________________ Date ________      Treating Provider: Ifrah Preciado, OTR/L, CHT    I have reviewed and certified the need for these services and plan of treatment while under my care.        PHYSICIAN'S SIGNATURE:   _________________________________________  Date___________    Rizwan Elias    Certification period: Beginning of Cert date period: 08/02/17 End of Cert period date: 10/30/17     Functional Level Progress Report: Please see attached \"Goal Flow sheet for Functional level.\"    ___X_____ Continue Services or       ________ DC Services                Service dates: SOC Date: 08/02/17  to present                                                                     "

## 2017-08-02 NOTE — PROGRESS NOTES
Hand Therapy Initial Evaluation    Current Date:  8/2/2017    Subjective:  Patricia A Sowada is a 72 year old right hand dominant female.    Diagnosis:   Left distal radius fracture  DOI:  6/17/17  DOS:  6/23/17  Procedure:  ORIF  Post:  5w 5d  Next MD f/u 8/30/17; Dr. Elias at [place]    Patient reports symptoms of pain, stiffness/loss of motion, weakness/loss of strength and edema of the left hand and wrist which occurred due to FOOSH when missed step on ladder. Since onset symptoms are gradually getting better.  Special tests:  x-ray.  Previous treatment: Casted until 7/20/17 now munster orthosis.  General health as reported by patient is excellent.  Pertinent medical history includes:none  Medical allergies:Penicillins.  Surgical history: orthopedic: recent wrist ORIF.  Medication history: Hydrochlorot, Latanoprost.    Occupational Profile Information:  Current occupation is Retired   Currently not working due to school not being in session  Prior functional level:  no limitations  Barriers include:none  Mobility: No difficulty  Transportation: drives  Leisure activities/hobbies: Reading, play piano, knitting and crocheting   Other: Subsitute teacher     Functional Outcome Measure:  Upper Extremity Functional Index  SCORE:   Column Totals: 11/80  (A lower score indicates greater disability.)    ROM:  Mild limitation of fingers to DPC, 2 cm  Thumb  8/2/17   AROM(PROM) Right Left   MP /40 /40   IP /50 /12   PAbd 40 35   RAbd 35 25     Wrist  8/2/17   AROM(PROM) Right Left   Extension 65 25   Flexion 75 15   RD 20 5   UD 35 10   Supination 80 35   Pronation 80 60     Strength:   (Measured in pounds)    8/2/17   Trials Right Left   1  2  3 48  50  45 10  8  8   Average: 48 9     Lat Pinch  8/2/17   Trials Right Left   1  2  3 13  14  13 4  4  4   Average: 13 4     3 Pt Pinch  8/2/17   Trials Right Left   1  2  3 11  10  10 2  3  3   Average: 10 3     Edema:  Circumference:  (Measured in cm)  Wrist Crease 8/2/17    Right 15.2   Left 17.2     Scar:  Sensitivity: Mild Quality:  Moderate    Sensation:  WNL throughout all nerve distributions; per patient report    Pain Report:  VAS(0-10) 8/2/17   At Rest: 0/10   With Use: 3/10   Location:  Ulnar styloid  Pain Quality:  Dull and pressure  Frequency: intermittent    Pain is worst:  daytime  Exacerbated by:  Dependent position, pressure to bone  Relieved by:  Remove splint  Progression:  Gradually improving  Assessment:  Patient presents with symptoms consistent with diagnosis of distal radius fracture, with surgical intervention.     Patient's limitations or Problem List includes:  Pain, Decreased ROM/motion, Increased edema, Weakness, Adherent scarring, Decreased  and Decreased pinch of the left wrist and hand which interferes with the patient's ability to perform Self Care Tasks (dressing, eating, bathing, hygiene/toileting), Work Tasks, Sleep Patterns, Recreational Activities, Household Chores and Driving  as compared to previous level of function.    Rehab Potential:  Good - Return to full activity, some limitations    Patient will benefit from skilled Occupational Therapy to increase ROM, flexibility, overall strength,  strength and pinch strength and decrease pain, edema and adherence of scarring to return to previous activity level and resume normal daily tasks and to reach their rehab potential.    Barriers to Learning:  No barrier    Communication Issues:  Patient appears to be able to clearly communicate and understand verbal and written communication and follow directions correctly.    Chart Review: Chart Review, Brief history including review of medical and/or therapy records relating to the presenting problem and Detailed history review with patient    Identified Performance Deficits: bathing/showering, toileting, dressing, feeding, hygiene and grooming, driving and community mobility, home establishment and management, meal preparation and cleanup, shopping,  sleep, work and leisure activities    Assessment of Occupational Performance:  5 or more Performance Deficits    Clinical Decision Making (Complexity): Low complexity    Treatment Explanation:  The following has been discussed with the patient:  RX ordered/plan of care  Anticipated outcomes  Possible risks and side effects    Plan:  Frequency:  2 X week, once daily  Duration:  for 2 weeks tapering to 1 X a week over 6 weeks    Treatment Plan:    Modalities:    US and Paraffin   Therapeutic Exercise:   AROM of fingers, thumb, wrist and forearm  PROM of wrist E/F and P/S   Overhead fisting to control edema  Progress to  and Pinch strengthening  Progress to Wrist Isotonic strengthening  Manual Techniques:   Scar mobilization  Joint mobilization techniques   Manual Edema Mobilization   Kinesiotape  Orthotic Fabrication:    Forearm based munster orthosis (fit at [place] 2 weeks ago)    Discharge Plan:    Achieve all LTG.  Independent in home treatment program.  Reach maximal therapeutic benefit.    Home Program:   Warmth for stiffness  Scar mobilization circular and 3 vector  AROM fingers 3 fists  AROM Thumb E/F and opposition  [place] orthosis, remove for exercise and hygiene  Encourage light functional use of left hand out of orthosis    Next Visit:  Add PROM for wrist and  strengthening  Progress to joint mobs and wrist isotonics

## 2017-08-08 ENCOUNTER — THERAPY VISIT (OUTPATIENT)
Dept: OCCUPATIONAL THERAPY | Facility: CLINIC | Age: 72
End: 2017-08-08
Payer: MEDICARE

## 2017-08-08 DIAGNOSIS — M25.632 STIFFNESS OF LEFT WRIST JOINT: ICD-10-CM

## 2017-08-08 DIAGNOSIS — S52.502D CLOSED FRACTURE OF DISTAL END OF LEFT RADIUS WITH ROUTINE HEALING, UNSPECIFIED FRACTURE MORPHOLOGY, SUBSEQUENT ENCOUNTER: ICD-10-CM

## 2017-08-08 DIAGNOSIS — Z47.89 AFTERCARE FOLLOWING SURGERY OF THE MUSCULOSKELETAL SYSTEM: ICD-10-CM

## 2017-08-08 PROCEDURE — 97140 MANUAL THERAPY 1/> REGIONS: CPT | Mod: GO | Performed by: OCCUPATIONAL THERAPIST

## 2017-08-08 PROCEDURE — 97110 THERAPEUTIC EXERCISES: CPT | Mod: GO | Performed by: OCCUPATIONAL THERAPIST

## 2017-08-08 NOTE — MR AVS SNAPSHOT
After Visit Summary   8/8/2017    Patricia A Sowada    MRN: [id number]           Patient Information     Date Of Birth          1945        Visit Information        Provider Department      8/8/2017 10:00 AM Ifrah Preciado [facility] [place]        Today's Diagnoses     Stiffness of left wrist joint        Closed fracture of distal end of left radius with routine healing, unspecified fracture morphology, subsequent encounter        Aftercare following surgery of the musculoskeletal system           Follow-ups after your visit        Your next 10 appointments already scheduled     Aug 10, 2017 10:00 AM CDT   SUDHAKAR Hand with Ifrah Preciado   [facility] [place] ([facility] [place] [facility])    [address]  [place] [address]   [phone]            Aug 14, 2017 11:30 AM CDT   SUDHAKAR Hand with Ifrah Preciado   [facility] [place] ([facility] [place] [facility])    [address]  [address]  [place] [address]   [phone]            Aug 17, 2017 11:30 AM CDT   SUDHAKAR Hand with Ifrah Preciado   [facility] [place] ([facility] [place] [facility])    [address]  [address]  [place] [address]   [phone]            Aug 28, 2017  7:30 AM CDT   SUDHAKAR Hand with Nicolle Pastor, OT   [facility] [place] ([facility] [place] [facility])    [address]  [address]  [place] [address]   [phone]            Aug 31, 2017  7:30 AM CDT   SUDHAKAR Hand with Nicolle Pastor, OT   [facility] [place] ([facility] [place] [facility])    [address]  [address]  [place] [address]   [phone]              Who to contact     If you have questions or need follow up information about today's clinic visit or your schedule please contact [facility] [place]  "directly at [phone].  Normal or non-critical lab and imaging results will be communicated to you by MyChart, letter or phone within 4 business days after the clinic has received the results. If you do not hear from us within 7 days, please contact the clinic through [employer] or phone. If you have a critical or abnormal lab result, we will notify you by phone as soon as possible.  Submit refill requests through [employer] or call your pharmacy and they will forward the refill request to us. Please allow 3 business days for your refill to be completed.          Additional Information About Your Visit        General Fusion[employer]ickr Information     [employer]ug lets you send messages to your doctor, view your test results, renew your prescriptions, schedule appointments and more. To sign up, go to [email or web address] . Click on \"Log in\" on the left side of the screen, which will take you to the Welcome page. Then click on \"Sign up Now\" on the right side of the page.     You will be asked to enter the access code listed below, as well as some personal information. Please follow the directions to create your username and password.     Your access code is: [email or web address]  Expires: 10/31/2017  3:28 PM     Your access code will  in 90 days. If you need help or a new code, please call your [place] clinic or [phone].        Care EveryWhere ID     This is your Care EveryWhere ID. This could be used by other organizations to access your [place] medical records  [id number]         Blood Pressure from Last 3 Encounters:   17 134/84   16 133/60   16 170/86    Weight from Last 3 Encounters:   17 77.6 kg (171 lb 2 oz)   16 82.7 kg (182 lb 4.8 oz)   11/10/16 80.3 kg (177 lb)              We Performed the Following     MANUAL THER TECH,1+REGIONS,EA 15 MIN     THERAPEUTIC EXERCISES        Primary Care Provider Office Phone # Fax #    Trae Cortessamson Villarsharifa  [phone]       [facility] [place] [address] [address]        Equal Access to Services     LIZETT JOSEPH : Hadii aad ku hadanisabeckie Sozhanna, warebeccada lujayant, qajessie stewartmageorgi whyte, mini dykesmarcelatanner serra. So [facility] [phone].    ATENCIÓN: Si habla español, tiene a espinosa disposición servicios gratuitos de asistencia lingüística. Llame al [phone].    We comply with applicable federal civil rights laws and [place] laws. We do not discriminate on the basis of race, color, national origin, age, disability sex, sexual orientation or gender identity.            Thank you!     Thank you for choosing [facility]  for your care. Our goal is always to provide you with excellent care. Hearing back from our patients is one way we can continue to improve our services. Please take a few minutes to complete the written survey that you may receive in the mail after your visit with us. Thank you!             Your Updated Medication List - Protect others around you: Learn how to safely use, store and throw away your medicines at www.disposemymeds.org.          This list is accurate as of: 8/8/17 10:44 AM.  Always use your most recent med list.                   Brand Name Dispense Instructions for use Diagnosis    aspirin 81 MG EC tablet      Take 81 mg by mouth daily        hydrochlorothiazide 12.5 MG Tabs tablet     90 tablet    Take 1 tablet (12.5 mg) by mouth daily    Hypertension goal BP (blood pressure) < 140/90       latanoprost 0.005 % ophthalmic solution    XALATAN     Place 1 drop into both eyes At Bedtime

## 2017-08-08 NOTE — PROGRESS NOTES
SOAP note objective information for 8/8/2017:    Diagnosis:   Left distal radius fracture  DOI:  6/17/17  DOS:  6/23/17  Procedure:  ORIF  Post:  6w 4d    ROM:  Wrist  8/2/17 8/8/17   AROM(PROM) Right Left Left   Extension 65 25 30 (35)   Flexion 75 15 25 (30)   RD 20 5    UD 35 10    Supination 80 35 45 (50)   Pronation 80 60 65     Home Program:   Warmth for stiffness  Scar mobilization circular and 3 vector  AROM fingers 3 fists  AROM Thumb E/F and opposition  Thumb IP blocking exercises  AROM wrist and forearm all planes  Gentle PROM wrist E/F and supination  [place] orthosis, taper use as tolerated  Encourage light functional use of left hand    Next Visit:  Continue 1-2x/week with scar/joint mobs and A/PROM  Assess response to PROM wrist  Add  strengthening  Progress to wrist isotonics as able    Please refer to the daily flowsheet for treatment today, total treatment time and time spent performing 1:1 timed codes.

## 2017-08-10 ENCOUNTER — THERAPY VISIT (OUTPATIENT)
Dept: OCCUPATIONAL THERAPY | Facility: CLINIC | Age: 72
End: 2017-08-10
Payer: MEDICARE

## 2017-08-10 DIAGNOSIS — M25.632 STIFFNESS OF LEFT WRIST JOINT: ICD-10-CM

## 2017-08-10 DIAGNOSIS — Z47.89 AFTERCARE FOLLOWING SURGERY OF THE MUSCULOSKELETAL SYSTEM: ICD-10-CM

## 2017-08-10 DIAGNOSIS — S52.502D CLOSED FRACTURE OF DISTAL END OF LEFT RADIUS WITH ROUTINE HEALING, UNSPECIFIED FRACTURE MORPHOLOGY, SUBSEQUENT ENCOUNTER: ICD-10-CM

## 2017-08-10 PROCEDURE — 97110 THERAPEUTIC EXERCISES: CPT | Mod: GO | Performed by: OCCUPATIONAL THERAPIST

## 2017-08-10 PROCEDURE — 97140 MANUAL THERAPY 1/> REGIONS: CPT | Mod: GO | Performed by: OCCUPATIONAL THERAPIST

## 2017-08-10 NOTE — PROGRESS NOTES
HEP Additions:  PROM wrist RD/UD   strengthening with yellow foam wedge    Please refer to the daily flowsheet for treatment today, total treatment time and time spent performing 1:1 timed codes.

## 2017-08-10 NOTE — MR AVS SNAPSHOT
After Visit Summary   8/10/2017    Patricia A Sowada    MRN: [id number]           Patient Information     Date Of Birth          1945        Visit Information        Provider Department      8/10/2017 10:00 AM Ifrah Preciado [facility] [place]        Today's Diagnoses     Stiffness of left wrist joint        Closed fracture of distal end of left radius with routine healing, unspecified fracture morphology, subsequent encounter        Aftercare following surgery of the musculoskeletal system           Follow-ups after your visit        Your next 10 appointments already scheduled     Aug 17, 2017 11:30 AM CDT   SUDHAKAR Hand with Ifrah Preciado   [facility] [place] ([facility] [place] [facility])    [address]  [place] [address]   [phone]            Aug 23, 2017 10:00 AM CDT   SUDHAKAR Hand with Ifrah Preciado   [facility] [place] ([facility] [place] [facility])    [address]  [place] [address]   [phone]            Aug 31, 2017  7:30 AM CDT   SUDHAKAR Hand with Nicolle Pastor OT   [facility] [place] ([facility] [place] [facility])    [address]  [place] [address]   [phone]              Who to contact     If you have questions or need follow up information about today's clinic visit or your schedule please contact [facility] [place] directly at [phone].  Normal or non-critical lab and imaging results will be communicated to you by MyChart, letter or phone within 4 business days after the clinic has received the results. If you do not hear from us within 7 days, please contact the clinic through Bluedhart or phone. If you have a critical or abnormal lab result, we will notify you by phone as soon as possible.  Submit refill requests through [employer] or call your pharmacy and  "they will forward the refill request to us. Please allow 3 business days for your refill to be completed.          Additional Information About Your Visit        MyChart Information     PushSpring lets you send messages to your doctor, view your test results, renew your prescriptions, schedule appointments and more. To sign up, go to [email or web address] . Click on \"Log in\" on the left side of the screen, which will take you to the Welcome page. Then click on \"Sign up Now\" on the right side of the page.     You will be asked to enter the access code listed below, as well as some personal information. Please follow the directions to create your username and password.     Your access code is: [email or web address]  Expires: 10/31/2017  3:28 PM     Your access code will  in 90 days. If you need help or a new code, please call your [place] clinic or [phone].        Care EveryWhere ID     This is your [facility] EveryWhere ID. This could be used by other organizations to access your [place] medical records  [id number]         Blood Pressure from Last 3 Encounters:   17 134/84   16 133/60   16 170/86    Weight from Last 3 Encounters:   17 77.6 kg (171 lb 2 oz)   16 82.7 kg (182 lb 4.8 oz)   11/10/16 80.3 kg (177 lb)              We Performed the Following     MANUAL THER TECH,1+REGIONS,EA 15 MIN     THERAPEUTIC EXERCISES        Primary Care Provider Office Phone # Fax #    Trae Gt Medina [phone]       [facility] [facility] [address]        Equal Access to Services     LIZETT JOSEPH : Hadii marley [place], warebeccada catrina, qaybta sheldonalyrn whyte, mini serra. So [facility] [phone].    ATENCIÓN: Si habla español, tiene a espinosa disposición servicios gratuitos de asistencia lingüística. Llame al [phone].    We comply with applicable federal civil rights laws and [place] laws. We do not discriminate on the basis " of race, color, national origin, age, disability sex, sexual orientation or gender identity.            Thank you!     Thank you for choosing [place] [facility] [place]  for your care. Our goal is always to provide you with excellent care. Hearing back from our patients is one way we can continue to improve our services. Please take a few minutes to complete the written survey that you may receive in the mail after your visit with us. Thank you!             Your Updated Medication List - Protect others around you: Learn how to safely use, store and throw away your medicines at www.disposemymeds.org.          This list is accurate as of: 8/10/17 10:37 AM.  Always use your most recent med list.                   Brand Name Dispense Instructions for use Diagnosis    aspirin 81 MG EC tablet      Take 81 mg by mouth daily        hydrochlorothiazide 12.5 MG Tabs tablet     90 tablet    Take 1 tablet (12.5 mg) by mouth daily    Hypertension goal BP (blood pressure) < 140/90       latanoprost 0.005 % ophthalmic solution    XALATAN     Place 1 drop into both eyes At Bedtime

## 2017-08-17 ENCOUNTER — THERAPY VISIT (OUTPATIENT)
Dept: OCCUPATIONAL THERAPY | Facility: CLINIC | Age: 72
End: 2017-08-17
Payer: MEDICARE

## 2017-08-17 DIAGNOSIS — M25.632 STIFFNESS OF LEFT WRIST JOINT: ICD-10-CM

## 2017-08-17 DIAGNOSIS — Z47.89 AFTERCARE FOLLOWING SURGERY OF THE MUSCULOSKELETAL SYSTEM: ICD-10-CM

## 2017-08-17 DIAGNOSIS — S52.502D CLOSED FRACTURE OF DISTAL END OF LEFT RADIUS WITH ROUTINE HEALING, UNSPECIFIED FRACTURE MORPHOLOGY, SUBSEQUENT ENCOUNTER: ICD-10-CM

## 2017-08-17 PROCEDURE — 97140 MANUAL THERAPY 1/> REGIONS: CPT | Mod: GO | Performed by: OCCUPATIONAL THERAPIST

## 2017-08-17 PROCEDURE — 97110 THERAPEUTIC EXERCISES: CPT | Mod: GO | Performed by: OCCUPATIONAL THERAPIST

## 2017-08-17 NOTE — PROGRESS NOTES
SOAP note objective information for 8/17/2017:    Diagnosis:   Left distal radius fracture  DOI:  6/17/17  DOS:  6/23/17  Procedure:  ORIF  Post:  7w 6d    ROM:  Wrist  8/2/17 8/8/17 8/17/17   AROM(PROM) Right Left Left Left   Extension  After Tx 65 25 30 (35) 35 (40)  40 (45)   Flexion  After Tx 75 15 25 (30) 30 (35)  40 (45)   RD 20 5  10   UD 35 10  15   Supination 80 35 45 (50) 70   Pronation 80 60 65 70     Strength:   (Measured in pounds)    8/2/17 8/17/17   Trials Right Left Left   1  2  3 48  50  45 10  8  8 15  15  14   Average: 48 9 15     Home Program:   Warmth for stiffness  Scar mobilization circular and 3 vector  AROM fingers 3 fists  AROM Thumb E/F and opposition  Thumb IP blocking exercises  AROM wrist and forearm all planes  PROM wrist E/F, R/U and supination  Gentle  strengthening with yellow foam wedge, progress to pink as able  Wrist E/F isotonics  [place] orthosis, discontinue  Encourage light functional use of left hand    Next Visit:  Continue 1x/week with scar/joint mobs and A/PROM  Assess response to wrist isotonics  Progress Report next visit for MD f/u at [place] 8/30/17    Please refer to the daily flowsheet for treatment today, total treatment time and time spent performing 1:1 timed codes.

## 2017-08-17 NOTE — MR AVS SNAPSHOT
After Visit Summary   8/17/2017    Patricia A Sowada    MRN: [id number]           Patient Information     Date Of Birth          1945        Visit Information        Provider Department      8/17/2017 11:30 AM Ifrah Preciado [facility] [place]        Today's Diagnoses     Stiffness of left wrist joint        Closed fracture of distal end of left radius with routine healing, unspecified fracture morphology, subsequent encounter        Aftercare following surgery of the musculoskeletal system           Follow-ups after your visit        Your next 10 appointments already scheduled     Aug 23, 2017 10:00 AM CDT   SUDHAKAR Hand with Ifrah Preciado   [facility] [place] ([facility] [place] [facility])    [address]  [place] [address]   [phone]            Aug 31, 2017  7:30 AM CDT   SUDHAKAR Hand with Nicolle Pastor OT   [facility] [place] ([facility] [place] [facility])    [address]  [place] [address]   [phone]              Who to contact     If you have questions or need follow up information about today's clinic visit or your schedule please contact [facility] [place] directly at [phone].  Normal or non-critical lab and imaging results will be communicated to you by zandahart, letter or phone within 4 business days after the clinic has received the results. If you do not hear from us within 7 days, please contact the clinic through zandahart or phone. If you have a critical or abnormal lab result, we will notify you by phone as soon as possible.  Submit refill requests through [employer] or call your pharmacy and they will forward the refill request to us. Please allow 3 business days for your refill to be completed.          Additional Information About Your Visit        zandaharRemark Media Information     [employer] lets you send messages to  "your doctor, view your test results, renew your prescriptions, schedule appointments and more. To sign up, go to [email or web address] . Click on \"Log in\" on the left side of the screen, which will take you to the Welcome page. Then click on \"Sign up Now\" on the right side of the page.     You will be asked to enter the access code listed below, as well as some personal information. Please follow the directions to create your username and password.     Your access code is: [email or web address]  Expires: 10/31/2017  3:28 PM     Your access code will  in 90 days. If you need help or a new code, please call your [place] clinic or [phone].        Care EveryWhere ID     This is your Care EveryWhere ID. This could be used by other organizations to access your [place] medical records  [id number]         Blood Pressure from Last 3 Encounters:   17 134/84   16 133/60   16 170/86    Weight from Last 3 Encounters:   17 77.6 kg (171 lb 2 oz)   16 82.7 kg (182 lb 4.8 oz)   11/10/16 80.3 kg (177 lb)              We Performed the Following     MANUAL THER TECH,1+REGIONS,EA 15 MIN     THERAPEUTIC EXERCISES        Primary Care Provider Office Phone # Fax #    Trae Medina [phone]       [address] [place] [address]        Equal Access to Services     LIZETT JOSEPH : Hadii aad ku hadasho Soomaali, waaxda luqadaha, qaybta kaalmada adeegyada, mini serra. So [facility] [phone].    ATENCIÓN: Si narayanla farheen, tiene a espinosa disposición servicios gratuitos de asistencia lingüística. Llame al [phone].    We comply with applicable federal civil rights laws and [place] laws. We do not discriminate on the basis of race, color, national origin, age, disability sex, sexual orientation or gender identity.            Thank you!     Thank you for choosing [facility] [place]  for your care. Our goal " is always to provide you with excellent care. Hearing back from our patients is one way we can continue to improve our services. Please take a few minutes to complete the written survey that you may receive in the mail after your visit with us. Thank you!             Your Updated Medication List - Protect others around you: Learn how to safely use, store and throw away your medicines at www.disposemymeds.org.          This list is accurate as of: 8/17/17 12:03 PM.  Always use your most recent med list.                   Brand Name Dispense Instructions for use Diagnosis    aspirin 81 MG EC tablet      Take 81 mg by mouth daily        hydrochlorothiazide 12.5 MG Tabs tablet     90 tablet    Take 1 tablet (12.5 mg) by mouth daily    Hypertension goal BP (blood pressure) < 140/90       latanoprost 0.005 % ophthalmic solution    XALATAN     Place 1 drop into both eyes At Bedtime

## 2017-08-23 ENCOUNTER — TRANSFERRED RECORDS (OUTPATIENT)
Dept: HEALTH INFORMATION MANAGEMENT | Facility: CLINIC | Age: 72
End: 2017-08-23

## 2017-08-23 ENCOUNTER — THERAPY VISIT (OUTPATIENT)
Dept: OCCUPATIONAL THERAPY | Facility: CLINIC | Age: 72
End: 2017-08-23
Payer: MEDICARE

## 2017-08-23 DIAGNOSIS — S52.502D CLOSED FRACTURE OF DISTAL END OF LEFT RADIUS WITH ROUTINE HEALING, UNSPECIFIED FRACTURE MORPHOLOGY, SUBSEQUENT ENCOUNTER: ICD-10-CM

## 2017-08-23 DIAGNOSIS — Z47.89 AFTERCARE FOLLOWING SURGERY OF THE MUSCULOSKELETAL SYSTEM: ICD-10-CM

## 2017-08-23 DIAGNOSIS — M25.632 STIFFNESS OF LEFT WRIST JOINT: ICD-10-CM

## 2017-08-23 PROCEDURE — G8987 SELF CARE CURRENT STATUS: HCPCS | Mod: GO | Performed by: OCCUPATIONAL THERAPIST

## 2017-08-23 PROCEDURE — G8988 SELF CARE GOAL STATUS: HCPCS | Mod: GO | Performed by: OCCUPATIONAL THERAPIST

## 2017-08-23 PROCEDURE — 97140 MANUAL THERAPY 1/> REGIONS: CPT | Mod: GO | Performed by: OCCUPATIONAL THERAPIST

## 2017-08-23 PROCEDURE — 97110 THERAPEUTIC EXERCISES: CPT | Mod: GO | Performed by: OCCUPATIONAL THERAPIST

## 2017-08-23 NOTE — MR AVS SNAPSHOT
"              After Visit Summary   8/23/2017    Patricia A Sowada    MRN: [id number]           Patient Information     Date Of Birth          1945        Visit Information        Provider Department      8/23/2017 10:00 AM Ifrah Preciado [facility] [place]        Today's Diagnoses     Stiffness of left wrist joint        Closed fracture of distal end of left radius with routine healing, unspecified fracture morphology, subsequent encounter        Aftercare following surgery of the musculoskeletal system           Follow-ups after your visit        Your next 10 appointments already scheduled     Aug 31, 2017  7:30 AM CDT   SUDHAKAR Hand with Nicolle Pastor OT   [facility] [place] (SUDHAKAR [facility] [place] [facility])    [address]  [place] [address] [phone]              Who to contact     If you have questions or need follow up information about today's clinic visit or your schedule please contact [facility] [place] directly at [phone].  Normal or non-critical lab and imaging results will be communicated to you by AppSlingrhart, letter or phone within 4 business days after the clinic has received the results. If you do not hear from us within 7 days, please contact the clinic through [employer] or phone. If you have a critical or abnormal lab result, we will notify you by phone as soon as possible.  Submit refill requests through [employer] or call your pharmacy and they will forward the refill request to us. Please allow 3 business days for your refill to be completed.          Additional Information About Your Visit        [employer] Information     AlertaPhone lets you send messages to your doctor, view your test results, renew your prescriptions, schedule appointments and more. To sign up, go to [email or web address] . Click on \"Log in\" on the left side of the screen, which will take you to the " "Welcome page. Then click on \"Sign up Now\" on the right side of the page.     You will be asked to enter the access code listed below, as well as some personal information. Please follow the directions to create your username and password.     Your access code is: [email or web address]  Expires: 10/31/2017  3:28 PM     Your access code will  in 90 days. If you need help or a new code, please call your [place] clinic or [phone].        Care EveryWhere ID     This is your Care EveryWhere ID. This could be used by other organizations to access your [place] medical records  [id number]         Blood Pressure from Last 3 Encounters:   17 134/84   16 133/60   16 170/86    Weight from Last 3 Encounters:   17 77.6 kg (171 lb 2 oz)   16 82.7 kg (182 lb 4.8 oz)   11/10/16 80.3 kg (177 lb)              Today, you had the following     No orders found for display       Primary Care Provider Office Phone # Fax #    Trae Medina [phone]       [address] [address]        Equal Access to Services     LIZETT JOSEPH : Hadii marley ku hadasho Solatashaali, waaxda luqadaha, qaybta kaalmada adeegyada, mini serra. So [facility] [place] [phone].    ATENCIÓN: Si habla español, tiene a espinosa disposición servicios gratuitos de asistencia lingüística. Llame al [phone].    We comply with applicable federal civil rights laws and [place] laws. We do not discriminate on the basis of race, color, national origin, age, disability sex, sexual orientation or gender identity.            Thank you!     Thank you for choosing [place] [facility] [place]  for your care. Our goal is always to provide you with excellent care. Hearing back from our patients is one way we can continue to improve our services. Please take a few minutes to complete the written survey that you may receive in the mail after your visit with us. Thank " you!             Your Updated Medication List - Protect others around you: Learn how to safely use, store and throw away your medicines at www.disposemymeds.org.          This list is accurate as of: 8/23/17 10:13 AM.  Always use your most recent med list.                   Brand Name Dispense Instructions for use Diagnosis    aspirin 81 MG EC tablet      Take 81 mg by mouth daily        hydrochlorothiazide 12.5 MG Tabs tablet     90 tablet    Take 1 tablet (12.5 mg) by mouth daily    Hypertension goal BP (blood pressure) < 140/90       latanoprost 0.005 % ophthalmic solution    XALATAN     Place 1 drop into both eyes At Bedtime

## 2017-08-23 NOTE — PROGRESS NOTES
Hand Therapy Progress Note    Current Date:  8/23/2017    Reporting period is 8/2/2017 to 8/23/2017    Diagnosis:   Left distal radius fracture  DOI:  6/17/17  DOS:  6/23/17  Procedure:  ORIF  Post:  8w 5d    Subjective:   Subjective changes noted by patient:  I do just about everything with it now.  It feels great, just still a little weak.  Functional changes noted by patient:  Improvement in Self Care Tasks (bathing)  Patient has noted adverse reaction to:  None    Functional Outcome Measure:  Upper Extremity Functional Index  SCORE:   Column Totals: 47/80  (A lower score indicates greater disability.)    Objective:  ROM:  Finger flexion now WNL's (previously 2 cm from DPC)  Thumb  8/2/17 8/23/17   AROM(PROM) Right Left Left   MP /40 /40 /40   IP /50 /12 /40   PAbd 40 35 35   RAbd 35 25 30     Wrist  8/2/17 8/23/17   AROM(PROM) Right Left Left   Extension 65 25 40 (45)   Flexion 75 15 35 (35)   RD 20 5 15   UD 35 10 15   Supination 80 35 75   Pronation 80 60 75     Strength:   (Measured in pounds)    8/2/17 8/23/17   Trials Right Left Left   1  2  3 48  50  45 10  8  8 18  15  15   Average: 48 9 16     Lat Pinch  8/2/17 8/23/17   Trials Right Left Left   1  2  3 13  14  13 4  4  4 6  7  8   Average: 13 4 7     3 Pt Pinch  8/2/17 8/23/17   Trials Right Left Left   1  2  3 11  10  10 2  3  3 6  6  5   Average: 10 3 6     Edema:  Circumference:  (Measured in cm)  Wrist Crease 8/2/17 8/23/17   Right 15.2    Left 17.2 16.2     Scar:  Sensitivity: Mild Quality:  Mild to Moderate    Sensation:  WNL throughout all nerve distributions; per patient report    Pain Report:  VAS(0-10) 8/2/17 8/23/17   At Rest: 0/10    With Use: 3/10 0/10   Location:  Ulnar styloid    Please refer to the daily flowsheet for treatment provided today.     Assessment:  Response to therapy has been improvement to:  ROM of Wrist:  All Planes  Thumb:  Flexion  Fingers: Flexion  Strength:   and pinch  Edema:  Decrease circumference  Pain:   frequency is less    Overall Assessment:  Patient is progressing well and is ready to decrease frequency of treatment in the clinic.  Patient would benefit from continued therapy to achieve rehab potential  STG/LTG:  STGoals have been reviewed and progress or achievement has occurred;  see goal sheet for details and updates.  I have re-evaluated this patient and find that the nature, scope, duration and intensity of the therapy is appropriate for the medical condition of the patient.    Plan:  Frequency/Duration:  Recommend continuing with the current treatment plan. 1 X week, once daily  for 2 months    Recommendations for Continued Therapy  Treatment Plan:    Modalities:    US and Paraffin   Therapeutic Exercise:   AROM of fingers, thumb, wrist and forearm  PROM of wrist E/F and P/S    and Pinch strengthening  Wrist Isotonic strengthening  Manual Techniques:   Scar mobilization  Joint mobilization techniques    Kinesiotape    Home Program:   Warmth for stiffness  Scar mobilization circular and 3 vector  AROM fingers 3 fists  AROM Thumb E/F and opposition  Thumb IP blocking exercises  AROM wrist and forearm all planes  PROM wrist E/F, R/U and supination  Gentle  strengthening with yellow foam wedge, progress to pink as able  Wrist E/F isotonics  Weight bearing on table top as tolerated  [place] orthosis, discontinue  Encourage light functional use of left hand    Next Visit:  Continue 1x/week with scar/joint mobs and A/PROM  Assess response to weight bearing  Await MD f/u at [place] 8/30/17

## 2017-08-23 NOTE — LETTER
[facility] [place]  [address]  [place] [address]  [phone]    2017    Re: Patricia A Sowada   :   1945  MRN:  [id number]   REFERRING PHYSICIAN:   Rizwan Elias    [facility] [place]  Date of Initial Evaluation:  ***  Visits:  Rxs Used: 5  Reason for Referral:     Stiffness of left wrist joint  Closed fracture of distal end of left radius with routine healing, unspecified fracture morphology, subsequent encounter  Aftercare following surgery of the musculoskeletal system  EVALUATION SUMMARY  Hand Therapy Progress Note  Current Date:  2017  Reporting period is 2017 to 2017  Diagnosis:   Left distal radius fracture  DOI:  17  DOS:  17  Procedure:  ORIF  Post:  8w 5d  Subjective:   Subjective changes noted by patient:  I do just about everything with it now.  It feels great, just still a little weak.  Functional changes noted by patient:  Improvement in Self Care Tasks (bathing)  Patient has noted adverse reaction to:  None  Functional Outcome Measure:  Upper Extremity Functional Index  SCORE:   Column Totals: 47/80  (A lower score indicates greater disability.)    Objective:  ROM:  Finger flexion now WNL's (previously 2 cm from DPC)  Thumb  17   AROM(PROM) Right Left Left   MP /40 /40 /40   IP /50 /12 /40   PAbd 40 35 35   RAbd 35 25 30     Wrist  17   AROM(PROM) Right Left Left   Extension 65 25 40 (45)   Flexion 75 15 35 (35)   RD 20 5 15   UD 35 10 15   Supination 80 35 75   Pronation 80 60 75     Strength:   (Measured in pounds)    17   Trials Right Left Left   1  2  3 48  50  45 10  8  8 18  15  15   Average: 48 9 16     Lat Pinch  17   Trials Right Left Left   1  2  3 13  14  13 4  4  4 6  7  8   Average: 13 4 7     3 Pt Pinch  17   Trials Right Left Left   1  2  3 11  10  10 2  3  3 6  6  5   Average: 10  3 6     Edema:  Circumference:  (Measured in cm)  Wrist Crease 8/2/17 8/23/17   Right 15.2    Left 17.2 16.2   Scar:  Sensitivity: Mild Quality:  Mild to Moderate  Sensation:  WNL throughout all nerve distributions; per patient report  Pain Report:  VAS(0-10) 8/2/17 8/23/17   At Rest: 0/10    With Use: 3/10 0/10   Location:  Ulnar styloid  Please refer to the daily flowsheet for treatment provided today.   Assessment:  Response to therapy has been improvement to:  ROM of Wrist:  All Planes  Thumb:  Flexion  Fingers: Flexion  Strength:   and pinch  Edema:  Decrease circumference  Pain:  frequency is less  Overall Assessment:  Patient is progressing well and is ready to decrease frequency of treatment in the clinic.  Patient would benefit from continued therapy to achieve rehab potential  STG/LTG:  STGoals have been reviewed and progress or achievement has occurred;  see goal sheet for details and updates.  I have re-evaluated this patient and find that the nature, scope, duration and intensity of the therapy is appropriate for the medical condition of the patient.      Plan:  Frequency/Duration:  Recommend continuing with the current treatment plan. 1 X week, once daily  for 2 months  Recommendations for Continued Therapy  Treatment Plan:    Modalities:    US and Paraffin   Therapeutic Exercise:   AROM of fingers, thumb, wrist and forearm  PROM of wrist E/F and P/S    and Pinch strengthening  Wrist Isotonic strengthening  Manual Techniques:   Scar mobilization  Joint mobilization techniques    Kinesiotape    Home Program:   Warmth for stiffness  Scar mobilization circular and 3 vector  AROM fingers 3 fists  AROM Thumb E/F and opposition  Thumb IP blocking exercises  AROM wrist and forearm all planes  PROM wrist E/F, R/U and supination  Gentle  strengthening with yellow foam wedge, progress to pink as able  Wrist E/F isotonics  [place] orthosis, discontinue  Encourage light functional use of left  hand    Next Visit:  Continue 1x/week with scar/joint mobs and A/PROM  Await MD f/u at [place] 8/30/17    Thank you for your referral.    INQUIRIES  Therapist: Ifrah PICHARDO/L. CHT  [facility] [place]  [address] [address]  [place] [address]  Phone: [phone]  Fax: [phone]

## 2017-08-31 ENCOUNTER — THERAPY VISIT (OUTPATIENT)
Dept: OCCUPATIONAL THERAPY | Facility: CLINIC | Age: 72
End: 2017-08-31
Payer: MEDICARE

## 2017-08-31 DIAGNOSIS — Z47.89 AFTERCARE FOLLOWING SURGERY OF THE MUSCULOSKELETAL SYSTEM: ICD-10-CM

## 2017-08-31 DIAGNOSIS — M25.632 STIFFNESS OF LEFT WRIST JOINT: ICD-10-CM

## 2017-08-31 DIAGNOSIS — S52.502D CLOSED FRACTURE OF DISTAL END OF LEFT RADIUS WITH ROUTINE HEALING, UNSPECIFIED FRACTURE MORPHOLOGY, SUBSEQUENT ENCOUNTER: ICD-10-CM

## 2017-08-31 PROCEDURE — 97110 THERAPEUTIC EXERCISES: CPT | Mod: GO | Performed by: OCCUPATIONAL THERAPIST

## 2017-08-31 PROCEDURE — 97140 MANUAL THERAPY 1/> REGIONS: CPT | Mod: GO | Performed by: OCCUPATIONAL THERAPIST

## 2017-09-06 ENCOUNTER — THERAPY VISIT (OUTPATIENT)
Dept: OCCUPATIONAL THERAPY | Facility: CLINIC | Age: 72
End: 2017-09-06
Payer: MEDICARE

## 2017-09-06 DIAGNOSIS — M25.632 STIFFNESS OF LEFT WRIST JOINT: ICD-10-CM

## 2017-09-06 DIAGNOSIS — Z47.89 AFTERCARE FOLLOWING SURGERY OF THE MUSCULOSKELETAL SYSTEM: ICD-10-CM

## 2017-09-06 DIAGNOSIS — S52.502D CLOSED FRACTURE OF DISTAL END OF LEFT RADIUS WITH ROUTINE HEALING, UNSPECIFIED FRACTURE MORPHOLOGY, SUBSEQUENT ENCOUNTER: ICD-10-CM

## 2017-09-06 PROCEDURE — 97110 THERAPEUTIC EXERCISES: CPT | Mod: GO | Performed by: OCCUPATIONAL THERAPIST

## 2017-09-06 PROCEDURE — 97140 MANUAL THERAPY 1/> REGIONS: CPT | Mod: GO | Performed by: OCCUPATIONAL THERAPIST

## 2017-09-06 NOTE — MR AVS SNAPSHOT
"              After Visit Summary   9/6/2017    Patricia A Sowada    MRN: [id number]           Patient Information     Date Of Birth          1945        Visit Information        Provider Department      9/6/2017 1:30 PM Ifrah Preciado [facility] [place]        Today's Diagnoses     Stiffness of left wrist joint        Closed fracture of distal end of left radius with routine healing, unspecified fracture morphology, subsequent encounter        Aftercare following surgery of the musculoskeletal system           Follow-ups after your visit        Who to contact     If you have questions or need follow up information about today's clinic visit or your schedule please contact [facility] [place] directly at [phone].  Normal or non-critical lab and imaging results will be communicated to you by MyChart, letter or phone within 4 business days after the clinic has received the results. If you do not hear from us within 7 days, please contact the clinic through [employer] or phone. If you have a critical or abnormal lab result, we will notify you by phone as soon as possible.  Submit refill requests through Orgger or call your pharmacy and they will forward the refill request to us. Please allow 3 business days for your refill to be completed.          Additional Information About Your Visit        MyChart Information     Orgger lets you send messages to your doctor, view your test results, renew your prescriptions, schedule appointments and more. To sign up, go to [email or web address] . Click on \"Log in\" on the left side of the screen, which will take you to the Welcome page. Then click on \"Sign up Now\" on the right side of the page.     You will be asked to enter the access code listed below, as well as some personal information. Please follow the directions to create your username and password.     Your access code is: " [email or web address]  Expires: 10/31/2017  3:28 PM     Your access code will  in 90 days. If you need help or a new code, please call your [place] clinic or [phone].        Care EveryWhere ID     This is your Care EveryWhere ID. This could be used by other organizations to access your [place] medical records  [id number]         Blood Pressure from Last 3 Encounters:   17 134/84   16 133/60   16 170/86    Weight from Last 3 Encounters:   17 77.6 kg (171 lb 2 oz)   16 82.7 kg (182 lb 4.8 oz)   11/10/16 80.3 kg (177 lb)              We Performed the Following     MANUAL THER TECH,1+REGIONS,EA 15 MIN     THERAPEUTIC EXERCISES        Primary Care Provider Office Phone # Fax #    Trae Medina [phone]       [address] [place] [address]        Equal Access to Services     LIZETT JOSEPH AH: Hadii aad ku hadasho Soomaali, waaxda luqadaha, qaybta kaalmada adeegyada, waxay idiin hayaan darío kharatanner robert . So [facility] [phone].    ATENCIÓN: Si habla español, tiene a espinosa disposición servicios gratuitos de asistencia lingüística. Llame al [phone].    We comply with applicable federal civil rights laws and [place] laws. We do not discriminate on the basis of race, color, national origin, age, disability sex, sexual orientation or gender identity.            Thank you!     Thank you for choosing [facility] [place] [facility]  for your care. Our goal is always to provide you with excellent care. Hearing back from our patients is one way we can continue to improve our services. Please take a few minutes to complete the written survey that you may receive in the mail after your visit with us. Thank you!             Your Updated Medication List - Protect others around you: Learn how to safely use, store and throw away your medicines at www.disposemymeds.org.          This list is accurate as of: 17  1:59 PM.  Always use  your most recent med list.                   Brand Name Dispense Instructions for use Diagnosis    aspirin 81 MG EC tablet      Take 81 mg by mouth daily        hydrochlorothiazide 12.5 MG Tabs tablet     90 tablet    Take 1 tablet (12.5 mg) by mouth daily    Hypertension goal BP (blood pressure) < 140/90       latanoprost 0.005 % ophthalmic solution    XALATAN     Place 1 drop into both eyes At Bedtime

## 2017-09-06 NOTE — PROGRESS NOTES
SOAP note objective information for 9/6/2017:    ROM:  Thumb  8/2/17 8/23/17 9/6/17   AROM(PROM) Right Left Left Left   MP /40 /40 /40 /45   IP /50 /12 /40 /50   PAbd 40 35 35    RAbd 35 25 30      Wrist  8/2/17 8/23/17 9/6/17   AROM(PROM) Right Left Left Left   Extension  After Tx 65 25 40 (45) 40 (45)  45 (50)   Flexion  After Tx 75 15 35 (35) 35 (40)  40 (45)   RD 20 5 15    UD 35 10 15    Supination 80 35 75    Pronation 80 60 75      Strength:   (Measured in pounds)    8/2/17 8/23/17 9/6/17   Trials Right Left Left Left   1  2  3 48  50  45 10  8  8 18  15  15 20  18  18   Average: 48 9 16 18     Home Program:   Warmth for stiffness  Scar mobilization circular and 3 vector  AROM fingers 3 fists  AROM Thumb E/F and opposition  Thumb IP blocking exercises  AROM wrist and forearm all planes  PROM wrist E/F and R/U  Gentle  strengthening with yellow foam wedge, progress to pink as able  Wrist E/F isotonics  Weight bearing on table top   Encourage full use of left hand    Next Visit:  Continue 1x/week with scar/joint mobs and A/PROM    Please refer to the daily flowsheet for treatment today, total treatment time and time spent performing 1:1 timed codes.

## 2017-09-11 ENCOUNTER — THERAPY VISIT (OUTPATIENT)
Dept: OCCUPATIONAL THERAPY | Facility: CLINIC | Age: 72
End: 2017-09-11
Payer: MEDICARE

## 2017-09-11 DIAGNOSIS — S52.502D CLOSED FRACTURE OF DISTAL END OF LEFT RADIUS WITH ROUTINE HEALING, UNSPECIFIED FRACTURE MORPHOLOGY, SUBSEQUENT ENCOUNTER: ICD-10-CM

## 2017-09-11 DIAGNOSIS — M25.632 STIFFNESS OF LEFT WRIST JOINT: ICD-10-CM

## 2017-09-11 DIAGNOSIS — Z47.89 AFTERCARE FOLLOWING SURGERY OF THE MUSCULOSKELETAL SYSTEM: ICD-10-CM

## 2017-09-11 PROCEDURE — 97110 THERAPEUTIC EXERCISES: CPT | Mod: GO | Performed by: OCCUPATIONAL THERAPIST

## 2017-09-11 PROCEDURE — 97140 MANUAL THERAPY 1/> REGIONS: CPT | Mod: GO | Performed by: OCCUPATIONAL THERAPIST

## 2017-09-11 NOTE — MR AVS SNAPSHOT
"              After Visit Summary   9/11/2017    Patricia A Sowada    MRN: [id number]           Patient Information     Date Of Birth          1945        Visit Information        Provider Department      9/11/2017 12:00 PM Ifrah Preciado [facility] [place]        Today's Diagnoses     Stiffness of left wrist joint        Closed fracture of distal end of left radius with routine healing, unspecified fracture morphology, subsequent encounter        Aftercare following surgery of the musculoskeletal system           Follow-ups after your visit        Your next 10 appointments already scheduled     Sep 25, 2017  1:00 PM CDT   SUDHAKAR Hand with Ifrah Preciado   [facility] [place] ([facility]DH[facility]AR [facility] [place] [facility])    [address]  [place] [address] [phone]              Who to contact     If you have questions or need follow up information about today's clinic visit or your schedule please contact [facility] [place] directly at [phone].  Normal or non-critical lab and imaging results will be communicated to you by MyStreamhart, letter or phone within 4 business days after the clinic has received the results. If you do not hear from us within 7 days, please contact the clinic through KeVitat or phone. If you have a critical or abnormal lab result, we will notify you by phone as soon as possible.  Submit refill requests through [employer] or call your pharmacy and they will forward the refill request to us. Please allow 3 business days for your refill to be completed.          Additional Information About Your Visit        MyStreamharOvelin Information     [employer] lets you send messages to your doctor, view your test results, renew your prescriptions, schedule appointments and more. To sign up, go to [email or web address] . Click on \"Log in\" on the left side of the screen, which will take you to the Welcome " "page. Then click on \"Sign up Now\" on the right side of the page.     You will be asked to enter the access code listed below, as well as some personal information. Please follow the directions to create your username and password.     Your access code is: [email or web address]  Expires: 10/31/2017  3:28 PM     Your access code will  in 90 days. If you need help or a new code, please call your [place] clinic or [phone].        Care EveryWhere ID     This is your Care EveryWhere ID. This could be used by other organizations to access your [place] medical records  [id number]         Blood Pressure from Last 3 Encounters:   17 134/84   16 133/60   16 170/86    Weight from Last 3 Encounters:   17 77.6 kg (171 lb 2 oz)   16 82.7 kg (182 lb 4.8 oz)   11/10/16 80.3 kg (177 lb)              We Performed the Following     MANUAL THER TECH,1+REGIONS,EA 15 MIN     THERAPEUTIC EXERCISES        Primary Care Provider Office Phone # Fax #    Trae Medina [phone]       [address] [place] [address]        Equal Access to Services     LIZETT JOSEPH : Hadii marley ku hadasho Soomaali, waaxda luqadaha, qaybta kaalmada adeegyada, waxay lesvia serra. So [facility] [phone].    ATENCIÓN: Si habla español, tiene a espinosa disposición servicios gratuitos de asistencia lingüística. Llame al [phone].    We comply with applicable federal civil rights laws and [place] laws. We do not discriminate on the basis of race, color, national origin, age, disability sex, sexual orientation or gender identity.            Thank you!     Thank you for choosing [facility]  for your care. Our goal is always to provide you with excellent care. Hearing back from our patients is one way we can continue to improve our services. Please take a few minutes to complete the written survey that you may receive in the mail after " your visit with us. Thank you!             Your Updated Medication List - Protect others around you: Learn how to safely use, store and throw away your medicines at www.disposemymeds.org.          This list is accurate as of: 9/11/17 12:38 PM.  Always use your most recent med list.                   Brand Name Dispense Instructions for use Diagnosis    aspirin 81 MG EC tablet      Take 81 mg by mouth daily        hydrochlorothiazide 12.5 MG Tabs tablet     90 tablet    Take 1 tablet (12.5 mg) by mouth daily    Hypertension goal BP (blood pressure) < 140/90       latanoprost 0.005 % ophthalmic solution    XALATAN     Place 1 drop into both eyes At Bedtime

## 2017-09-11 NOTE — PROGRESS NOTES
SOAP note objective information for 9/11/2017:    ROM:  Wrist  8/2/17 8/23/17 9/6/17 9/11/17   AROM(PROM) Right Left Left Left Left   Extension  After Tx 65 25 40 (45) 40 (45)  45 (50) 40 (50)  50 (55)   Flexion  After Tx 75 15 35 (35) 35 (40)  40 (45) 35 (40)  45 (55)   RD 20 5 15     UD 35 10 15     Supination 80 35 75     Pronation 80 60 75       Strength:   (Measured in pounds)    8/2/17 8/23/17 9/6/17 9/11/17   Trials Right Left Left Left Left   1  2  3 48  50  45 10  8  8 18  15  15 20  18  18 26  24  23   Average: 48 9 16 18 24     Home Program:   Warmth for stiffness  Scar mobilization circular and 3 vector  AROM fingers 3 fists  AROM Thumb E/F and opposition  Thumb IP blocking exercises  AROM wrist and forearm all planes  PROM wrist E/F and R/U   strengthening   Wrist E/F isotonics  Weight bearing on table top   Encourage full use of left hand    Next Visit:  See in 2 weeks  Continue scar/joint mobs, ROM and strengthening  Progress Report  Taper to HEP as ROM for E/F improves    Please refer to the daily flowsheet for treatment today, total treatment time and time spent performing 1:1 timed codes.

## 2017-09-25 ENCOUNTER — THERAPY VISIT (OUTPATIENT)
Dept: OCCUPATIONAL THERAPY | Facility: CLINIC | Age: 72
End: 2017-09-25
Payer: MEDICARE

## 2017-09-25 DIAGNOSIS — Z47.89 AFTERCARE FOLLOWING SURGERY OF THE MUSCULOSKELETAL SYSTEM: ICD-10-CM

## 2017-09-25 DIAGNOSIS — M25.632 STIFFNESS OF LEFT WRIST JOINT: ICD-10-CM

## 2017-09-25 DIAGNOSIS — S52.502D CLOSED FRACTURE OF DISTAL END OF LEFT RADIUS WITH ROUTINE HEALING, UNSPECIFIED FRACTURE MORPHOLOGY, SUBSEQUENT ENCOUNTER: ICD-10-CM

## 2017-09-25 PROCEDURE — 97110 THERAPEUTIC EXERCISES: CPT | Mod: GO | Performed by: OCCUPATIONAL THERAPIST

## 2017-09-25 PROCEDURE — G8989 SELF CARE D/C STATUS: HCPCS | Mod: GO | Performed by: OCCUPATIONAL THERAPIST

## 2017-09-25 PROCEDURE — 97140 MANUAL THERAPY 1/> REGIONS: CPT | Mod: GO | Performed by: OCCUPATIONAL THERAPIST

## 2017-09-25 PROCEDURE — G8988 SELF CARE GOAL STATUS: HCPCS | Mod: GO | Performed by: OCCUPATIONAL THERAPIST

## 2017-09-25 NOTE — LETTER
[facility] [place]  [address]  [place] [address]  [phone]    2017    Re: Patricia A Sowada   :   1945  MRN:  [id number]   REFERRING PHYSICIAN:   Rizwan Elias    [facility] [place]    Date of Initial Evaluation: 17  Visits:  Rxs Used: 9  Reason for Referral:     Stiffness of left wrist joint  Closed fracture of distal end of left radius with routine healing, unspecified fracture morphology, subsequent encounter  Aftercare following surgery of the musculoskeletal system    EVALUATION SUMMARY    Hand Therapy Progress/Discharge Note    Current Date:  2017  Reporting period is 2017 to 2017  Diagnosis:   Left distal radius fracture  DOI:  17  DOS:  17  Procedure:  ORIF    Subjective:   Subjective changes noted by patient:  My wrist is just a little stiff in the mornings but I think I am about 96%!Functional changes noted by patient:  Improvement in Self Care Tasks (bathing) Patient has noted adverse reaction to:  None    Functional Outcome Measure:  Upper Extremity Functional Index  SCORE:   Column Totals: 78/80  (A lower score indicates greater disability.)    Objective:  ROM:    Thumb  17   AROM(PROM) Right Left Left Left   MP /40 /40 /40 /45   IP /50 /12 /40 /50   PAbd 40 35 35 35   RAbd 35 25 30 30       Patricia A Sowada   :   1945            Wrist  17   AROM(PROM) Right Left Left Left   Extension 65 25 40 (45) 45 (55)   Flexion 75 15 35 (35) 35 (40)   RD 20 5 15 15   UD 35 10 15 15   Supination 80 35 75 80   Pronation 80 60 75 80     Strength:   (Measured in pounds)    17   Trials Right Left Left Left   1  2  3 48  50  45 10  8  8 18  15  15 26  30  25   Average: 48 9 16 27     Lat Pinch  17   Trials Right Left Left Left   1  2  3 13  14  13 4  4  4 6  7  8 10  11  12    Average: 13 4 7 11     3 Pt Pinch  17   Trials Right Left Left Left   1  2  3 11  10  10 2  3  3 6  6  5 9  9  9   Average: 10 3 6 9     Edema:  Circumference:  (Measured in cm)  Wrist Crease 17   Right 15.2     Left 17.2 16.2 15.9     Scar:  Sensitivity: None Quality:  Minimally adherent    Sensation:  WNL throughout all nerve distributions; per patient report    Pain Report:  VAS(0-10) 17   At Rest: 0/10    With Use: 3/10 0/10   Location:  Ulnar styloid    Please refer to the daily flowsheet for treatment provided today.   Myla STANLEY Sowada   :   1945          Assessment:  Response to therapy has been improvement to:  ROM of Wrist:  All Planes  Thumb:  Flex  Strength:   and pinch    Overall Assessment:  Patient's symptoms are resolving and patient is independent in home exercise program  STG/LTG:  STGoals have been reviewed and progress or achievement has occurred;  see goal sheet for details and updates.  I have re-evaluated this patient and find that the nature, scope, duration and intensity of the therapy is appropriate for the medical condition of the patient.    Plan:  Frequency/Duration:  Discharge from Hand Therapy; continue home program.    Recommendations for Continued Therapy  Home Program:   Warmth for stiffness  Scar mobilization circular and 3 vector  AROM fingers 3 fists  AROM Thumb E/F and opposition  Thumb IP blocking exercises  AROM wrist and forearm all planes  PROM wrist E/F and R/U   strengthening   Wrist E/F isotonics  Weight bearing on table top    Thank you for your referral.    INQUIRIES  Therapist: KYLEE Slaughter/ANGELINA, CHT  [facility] [place]  [address]  [place] [address]  Phone: [phone]  Fax: [phone]

## 2017-09-25 NOTE — MR AVS SNAPSHOT
"              After Visit Summary   9/25/2017    Patricia A Sowada    MRN: [id number]           Patient Information     Date Of Birth          1945        Visit Information        Provider Department      9/25/2017 1:00 PM Ifrah Preciado [facility] [place]        Today's Diagnoses     Stiffness of left wrist joint        Closed fracture of distal end of left radius with routine healing, unspecified fracture morphology, subsequent encounter        Aftercare following surgery of the musculoskeletal system           Follow-ups after your visit        Who to contact     If you have questions or need follow up information about today's clinic visit or your schedule please contact [facility] [place] directly at [phone].  Normal or non-critical lab and imaging results will be communicated to you by MyChart, letter or phone within 4 business days after the clinic has received the results. If you do not hear from us within 7 days, please contact the clinic through [employer] or phone. If you have a critical or abnormal lab result, we will notify you by phone as soon as possible.  Submit refill requests through DigiMeld or call your pharmacy and they will forward the refill request to us. Please allow 3 business days for your refill to be completed.          Additional Information About Your Visit        MyChart Information     DigiMeld lets you send messages to your doctor, view your test results, renew your prescriptions, schedule appointments and more. To sign up, go to [email or web address] . Click on \"Log in\" on the left side of the screen, which will take you to the Welcome page. Then click on \"Sign up Now\" on the right side of the page.     You will be asked to enter the access code listed below, as well as some personal information. Please follow the directions to create your username and password.     Your access code is: " [email or web address]  Expires: 10/31/2017  3:28 PM     Your access code will  in 90 days. If you need help or a new code, please call your [place] clinic or [phone].        Care EveryWhere ID     This is your Care EveryWhere ID. This could be used by other organizations to access your [place] medical records  [id number]         Blood Pressure from Last 3 Encounters:   17 134/84   16 133/60   16 170/86    Weight from Last 3 Encounters:   17 77.6 kg (171 lb 2 oz)   16 82.7 kg (182 lb 4.8 oz)   11/10/16 80.3 kg (177 lb)              We Performed the Following     SUDHAKAR PROGRESS NOTES REPORT     MANUAL THER TECH,1+REGIONS,EA 15 MIN     THERAPEUTIC EXERCISES        Primary Care Provider Office Phone # Fax #    Trae Medina [phone]       [address] [place] [address]        Equal Access to Services     LIZETT JOSEPH : Hadii aad ku hadasho Soomaali, waaxda luqadaha, qaybta kaalmada adeegyada, waxay idiin hayaan darío robert . So [facility] [phone].    ATENCIÓN: Si habla español, tiene a espinosa disposición servicios gratuitos de asistencia lingüística. Llame al [phone].    We comply with applicable federal civil rights laws and [place] laws. We do not discriminate on the basis of race, color, national origin, age, disability sex, sexual orientation or gender identity.            Thank you!     Thank you for choosing [place] [facility] [place] [facility]  for your care. Our goal is always to provide you with excellent care. Hearing back from our patients is one way we can continue to improve our services. Please take a few minutes to complete the written survey that you may receive in the mail after your visit with us. Thank you!             Your Updated Medication List - Protect others around you: Learn how to safely use, store and throw away your medicines at www.disposemymeds.org.          This list is accurate as of:  9/25/17  1:31 PM.  Always use your most recent med list.                   Brand Name Dispense Instructions for use Diagnosis    aspirin 81 MG EC tablet      Take 81 mg by mouth daily        hydrochlorothiazide 12.5 MG Tabs tablet     90 tablet    Take 1 tablet (12.5 mg) by mouth daily    Hypertension goal BP (blood pressure) < 140/90       latanoprost 0.005 % ophthalmic solution    XALATAN     Place 1 drop into both eyes At Bedtime

## 2017-09-25 NOTE — PROGRESS NOTES
Hand Therapy Progress/Discharge Note    Current Date:  9/25/2017    Reporting period is 8/23/2017 to 9/25/2017    Diagnosis:   Left distal radius fracture  DOI:  6/17/17  DOS:  6/23/17  Procedure:  ORIF    Subjective:   Subjective changes noted by patient:  My wrist is just a little stiff in the mornings but I think I am about 96%!  Functional changes noted by patient:  Improvement in Self Care Tasks (bathing)  Patient has noted adverse reaction to:  None    Functional Outcome Measure:  Upper Extremity Functional Index  SCORE:   Column Totals: 78/80  (A lower score indicates greater disability.)    Objective:  ROM:    Thumb  8/2/17 8/23/17 9/25/17   AROM(PROM) Right Left Left Left   MP /40 /40 /40 /45   IP /50 /12 /40 /50   PAbd 40 35 35 35   RAbd 35 25 30 30     Wrist  8/2/17 8/23/17 9/25/17   AROM(PROM) Right Left Left Left   Extension 65 25 40 (45) 45 (55)   Flexion 75 15 35 (35) 35 (40)   RD 20 5 15 15   UD 35 10 15 15   Supination 80 35 75 80   Pronation 80 60 75 80     Strength:   (Measured in pounds)    8/2/17 8/23/17 9/25/17   Trials Right Left Left Left   1  2  3 48  50  45 10  8  8 18  15  15 26  30  25   Average: 48 9 16 27     Lat Pinch  8/2/17 8/23/17 9/25/17   Trials Right Left Left Left   1  2  3 13  14  13 4  4  4 6  7  8 10  11  12   Average: 13 4 7 11     3 Pt Pinch  8/2/17 8/23/17 9/25/17   Trials Right Left Left Left   1  2  3 11  10  10 2  3  3 6  6  5 9  9  9   Average: 10 3 6 9     Edema:  Circumference:  (Measured in cm)  Wrist Crease 8/2/17 8/23/17 9/25/17   Right 15.2     Left 17.2 16.2 15.9     Scar:  Sensitivity: None Quality:  Minimally adherent    Sensation:  WNL throughout all nerve distributions; per patient report    Pain Report:  VAS(0-10) 8/2/17 8/23/17   At Rest: 0/10    With Use: 3/10 0/10   Location:  Ulnar styloid    Please refer to the daily flowsheet for treatment provided today.     Assessment:  Response to therapy has been improvement to:  ROM of Wrist:  All  Planes  Thumb:  Flex  Strength:   and pinch    Overall Assessment:  Patient's symptoms are resolving and patient is independent in home exercise program  STG/LTG:  STGoals have been reviewed and progress or achievement has occurred;  see goal sheet for details and updates.  I have re-evaluated this patient and find that the nature, scope, duration and intensity of the therapy is appropriate for the medical condition of the patient.    Plan:  Frequency/Duration:  Discharge from Hand Therapy; continue home program.    Recommendations for Continued Therapy  Home Program:   Warmth for stiffness  Scar mobilization circular and 3 vector  AROM fingers 3 fists  AROM Thumb E/F and opposition  Thumb IP blocking exercises  AROM wrist and forearm all planes  PROM wrist E/F and R/U   strengthening   Wrist E/F isotonics  Weight bearing on table top

## 2017-12-05 ENCOUNTER — TELEPHONE (OUTPATIENT)
Dept: FAMILY MEDICINE | Facility: CLINIC | Age: 72
End: 2017-12-05

## 2017-12-05 DIAGNOSIS — I10 HTN (HYPERTENSION): Primary | ICD-10-CM

## 2017-12-05 NOTE — LETTER
[facility]  [address] [place] [address]  Phone: [phone]        December 15, 2017      Patricia A Sowada                                                                                                                                [address] [facility] [address]        Davie Lanza,        We have attempted to reach you regarding your refill request, but have been unsuccessful.    We have received a refill request for one of your medications. We have sent a refill of your medication to your pharmacy, however your provider has noted that you will need to be seen for a follow up visit in order to continue this medication.    Please contact us at [phone] to schedule an appointment with your provider before your next refill is due.      Thank you,      Your Health Care Team at the [facility]

## 2017-12-05 NOTE — TELEPHONE ENCOUNTER
hydrochlorothiazide 12.5 MG TABS   12.5 mg, DAILY 3 ordered  Reorder     Summary: Take 1 tablet (12.5 mg) by mouth daily, Disp-90 tablet, R-3, E-Prescribe   Dose, Route, Frequency: 12.5 mg, Oral, DAILY  Start: 11/10/2016  Ord/Sold: 11/10/2016 (O)  Report  Taking:   Long-term:   Pharmacy: [place] [id number] [place] [facility], [place] - [address]  Med Dose History  ChangeDiscontinue       Patient Sig: Take 1 tablet (12.5 mg) by mouth daily       Ordered on: 11/10/2016       Authorized by: JAGDISH DELATORRE       Dispense: 90 tablet        LOV: 4/12/2017

## 2017-12-08 RX ORDER — HYDROCHLOROTHIAZIDE 12.5 MG/1
CAPSULE ORAL
Qty: 30 CAPSULE | Refills: 0 | Status: SHIPPED | OUTPATIENT
Start: 2017-12-08 | End: 2018-01-21

## 2017-12-08 NOTE — TELEPHONE ENCOUNTER
Called patient and left a VM message to schedule an appointment w/  Adam/ will not get any more refills until seen.    Denise Simental

## 2018-01-21 DIAGNOSIS — I10 HTN (HYPERTENSION): ICD-10-CM

## 2018-01-22 NOTE — TELEPHONE ENCOUNTER
"Requested Prescriptions   Pending Prescriptions Disp Refills     hydrochlorothiazide (MICROZIDE) 12.5 MG capsule [Pharmacy Med Name: HYDROCHLOROTHIAZIDE 12.5 MG CP]  Last Written Prescription Date:  12/8/2017  Last Fill Quantity: 30 capsule,  # refills: 0   Last Office Visit with FMJULIANNA, YANE or [facility] prescribing provider:  4/12/2017  Future Office Visit:    Next 5 appointments (look out 90 days)     Feb 09, 2018  9:00 AM CST   [facility]    [address] [place] [id number] [phone]               30 capsule 0     Sig: TAKE 1 CAPSULE BY MOUTH DAILY    Diuretics (Including Combos) Protocol Failed    1/21/2018 12:58 AM       Failed - Normal serum creatinine on file in past 12 months    Recent Labs   Lab Test  12/25/16   0720   CR  0.61          Failed - Normal serum potassium on file in past 12 months    Recent Labs   Lab Test  12/25/16   0720   POTASSIUM  3.8          Failed - Normal serum sodium on file in past 12 months    Recent Labs   Lab Test  12/25/16   0720   NA  139          Passed - Blood pressure under 140/90    BP Readings from Last 3 Encounters:   04/12/17 134/84   12/25/16 133/60   11/20/16 170/86          Passed - Recent or future visit with authorizing provider's specialty    Patient had office visit in the last year or has a visit in the next 30 days with authorizing provider.  See \"Patient Info\" tab in inbasket, or \"Choose Columns\" in Meds & Orders section of the refill encounter.        Passed - Patient is age 18 or older       Passed - No active pregancy on record       Passed - No positive pregnancy test in past 12 months          "

## 2018-01-23 RX ORDER — HYDROCHLOROTHIAZIDE 12.5 MG/1
CAPSULE ORAL
Qty: 30 CAPSULE | Refills: 0 | Status: SHIPPED | OUTPATIENT
Start: 2018-01-23 | End: 2018-02-09

## 2018-02-09 ENCOUNTER — OFFICE VISIT (OUTPATIENT)
Dept: FAMILY MEDICINE | Facility: CLINIC | Age: 73
End: 2018-02-09
Payer: MEDICARE

## 2018-02-09 VITALS
TEMPERATURE: 97.6 F | DIASTOLIC BLOOD PRESSURE: 80 MMHG | SYSTOLIC BLOOD PRESSURE: 130 MMHG | HEART RATE: 80 BPM | WEIGHT: 181.2 LBS | OXYGEN SATURATION: 99 % | BODY MASS INDEX: 27.46 KG/M2 | HEIGHT: 68 IN

## 2018-02-09 DIAGNOSIS — I10 HYPERTENSION GOAL BP (BLOOD PRESSURE) < 140/90: Primary | ICD-10-CM

## 2018-02-09 DIAGNOSIS — Z23 NEED FOR PROPHYLACTIC VACCINATION WITH TETANUS-DIPHTHERIA (TD): ICD-10-CM

## 2018-02-09 DIAGNOSIS — Z91.81 AT RISK FOR FALLING: ICD-10-CM

## 2018-02-09 DIAGNOSIS — F43.21 GRIEF REACTION: ICD-10-CM

## 2018-02-09 DIAGNOSIS — Z13.220 LIPID SCREENING: ICD-10-CM

## 2018-02-09 PROCEDURE — 99214 OFFICE O/P EST MOD 30 MIN: CPT | Performed by: FAMILY MEDICINE

## 2018-02-09 PROCEDURE — 80061 LIPID PANEL: CPT | Performed by: FAMILY MEDICINE

## 2018-02-09 PROCEDURE — 90715 TDAP VACCINE 7 YRS/> IM: CPT | Performed by: FAMILY MEDICINE

## 2018-02-09 PROCEDURE — 36415 COLL VENOUS BLD VENIPUNCTURE: CPT | Performed by: FAMILY MEDICINE

## 2018-02-09 PROCEDURE — 80048 BASIC METABOLIC PNL TOTAL CA: CPT | Performed by: FAMILY MEDICINE

## 2018-02-09 RX ORDER — HYDROCHLOROTHIAZIDE 12.5 MG/1
CAPSULE ORAL
Qty: 90 CAPSULE | Refills: 1 | Status: SHIPPED | OUTPATIENT
Start: 2018-02-09 | End: 2018-08-08

## 2018-02-09 NOTE — NURSING NOTE
"Chief Complaint   Patient presents with     Hypertension       Initial /80 (BP Location: Right arm, Patient Position: Sitting, Cuff Size: Adult Large)  Pulse 80  Temp 97.6  F (36.4  C) (Oral)  Ht 5' 8\" (1.727 m)  Wt 181 lb 3.2 oz (82.2 kg)  SpO2 99%  BMI 27.55 kg/m2 Estimated body mass index is 27.55 kg/(m^2) as calculated from the following:    Height as of this encounter: 5' 8\" (1.727 m).    Weight as of this encounter: 181 lb 3.2 oz (82.2 kg).  Medication Reconciliation: complete    "

## 2018-02-09 NOTE — PATIENT INSTRUCTIONS
Continue current meds  Follow up as planned in the next 6 months for wellness visit    [facility]   Discharged by : Trudy SEGAL MA    If you have any questions regarding your visit please contact your care team:     Team Gold                Clinic Hours Telephone Number     Dr. Smitha Reinoso 7am-7pm  Monday - Thursday   7am-5pm  Fridays  [phone]   (Appointment scheduling available 24/7)     RN Line  [phone] option 2     Urgent Care - [place] - 11am-9pm Monday-Friday Saturday-Sunday- 9am-5pm     [place] -   5pm-9pm Monday-Friday Saturday-Sunday- 9am-5pm    [phone] - [place]    [phone] - [place]       For a Price Quote for your services, please call our Consumer Price Line at [phone].     What options do I have for visits at the clinic other than the traditional office visit?     To expand how we care for you, many of our providers are utilizing electronic visits (e-visits) and telephone visits, when medically appropriate, for interactions with their patients rather than a visit in the clinic. We also offer nurse visits for many medical concerns. Just like any other service, we will bill your insurance company for this type of visit based on time spent on the phone with your provider. Not all insurance companies cover these visits. Please check with your medical insurance if this type of visit is covered. You will be responsible for any charges that are not paid by your insurance.   E-visits via Ulmon: generally incur a $35.00 fee.     Telephone visits:   Time spent on the phone: *charged based on time that is spent on the phone in increments of 10 minutes. Estimated cost:   5-10 mins $30.00   11-20 mins. $59.00   21-30 mins. $85.00     Use Ulmon (secure email communication and access to your chart) to send your primary care provider a message or  make an appointment. Ask someone on your Team how to sign up for Get Fractal.     As always, Thank you for trusting us with your health care needs!      [facility]:    Scheduling Appointments  [place] [facility]  Call: [phone]    Ebony Álvarez [facility]  Call: [phone]    [facility]  Call: [phone]    For Gastroenterology referrals   [facility] Gastroenterology   Clinics and Surgery [place], 4th Floor   [address] [place] [address]   Appointments: [phone]    WHERE TO GO FOR CARE?  Clinic    Make an appointment if you:       Are sick (cold, cough, flu, sore throat, earache or in pain).       Have a small injury (sprain, small cut, burn or broken bone).       Need a physical exam, Pap smear, vaccine or prescription refill.       Have questions about your health or medicines.    To reach us:      Call [phone] ([phone]). Open 24 hours every day. (For counseling services, call [phone].)    Log into Get Fractal at [email or web address]. (Visit [email or web address] to create an account.) Hospital emergency room    An emergency is a serious or life- threatening problem that must be treated right away.    Call [phone] or get to the hospital if you have:      Very bad or sudden:            - Chest pain or pressure         - Bleeding         - Head or belly pain         - Dizziness or trouble seeing, walking or                          Speaking      Problems breathing      Blood in your vomit or you are coughing up blood      A major injury (knocked out, loss of a finger or limb, rape, broken bone protruding from skin)    A mental health crisis. (Or call the Mental Health Crisis line at 1-842.339.5994 or Suicide Prevention Hotline at 1-626.839.1651.)    Open 24 hours every day. You don't need an appointment.     Urgent care    Visit urgent care for sickness or small injuries when the clinic is closed. You don't need an  appointment. To check hours or find an urgent care near you, visit [email or web address]. Online care    Get online care from [facility] for more than 70 common problems, like colds, allergies and infections. Open 24 hours every day at:   [email or web address]   Need help deciding?    For advice about where to be seen, you may call your clinic and ask to speak with a nurse. We're here for you 24 hours every day.         If you are deaf or hard of hearing, please let us know. We provide many free services including sign language interpreters, oral interpreters, TTYs, telephone amplifiers, note takers and written materials.

## 2018-02-09 NOTE — MR AVS SNAPSHOT
After Visit Summary   2/9/2018    Patricia A Sowada    MRN: [id number]           Patient Information     Date Of Birth          1945        Visit Information        Provider Department      2/9/2018 9:00 AM Trae Medina DO [facility]        Today's Diagnoses     Hypertension goal BP (blood pressure) < 140/90    -  1    At risk for falling        Need for prophylactic vaccination with tetanus-diphtheria (TD)        Lipid screening          Care Instructions    Continue current meds  Follow up as planned in the next 6 months for wellness visit    [facility]   Discharged by : Trudy SEGAL MA    If you have any questions regarding your visit please contact your care team:     Team Gold                Clinic Hours Telephone Number     Dr. Smitha Reinoso 7am-7pm  Monday - Thursday   7am-5pm  Fridays  [phone]   (Appointment scheduling available 24/7)     RN Line  [phone] option 2     Urgent Care - [place] and [facility] - 11am-9pm Monday-Friday Saturday-Sunday- 9am-5pm     [place] -   5pm-9pm Monday-Friday Saturday-Sunday- 9am-5pm    [phone] - [place]    [phone] - [place]       For a Price Quote for your services, please call our Consumer Price Line at [phone].     What options do I have for visits at the clinic other than the traditional office visit?     To expand how we care for you, many of our providers are utilizing electronic visits (e-visits) and telephone visits, when medically appropriate, for interactions with their patients rather than a visit in the clinic. We also offer nurse visits for many medical concerns. Just like any other service, we will bill your insurance company for this type of visit based on time spent on the phone with your provider. Not all insurance companies cover these visits. Please check  with your medical insurance if this type of visit is covered. You will be responsible for any charges that are not paid by your insurance.   E-visits via Zelos Therapeuticshart: generally incur a $35.00 fee.     Telephone visits:   Time spent on the phone: *charged based on time that is spent on the phone in increments of 10 minutes. Estimated cost:   5-10 mins $30.00   11-20 mins. $59.00   21-30 mins. $85.00     Use Playthe.net (secure email communication and access to your chart) to send your primary care provider a message or make an appointment. Ask someone on your Team how to sign up for [email or web address].     As always, Thank you for trusting us with your health care needs!      [facility]:    Scheduling Appointments  [place]  Call: [phone]    Ebony Álvarez [facility]  Call: [phone]    [facility]  Call: [phone]    For Gastroenterology referrals   [facility] Gastroenterology   Clinics and Surgery Center, 4th Floor   [address] [place] [address]   Appointments: [phone]    WHERE TO GO FOR CARE?  Clinic    Make an appointment if you:       Are sick (cold, cough, flu, sore throat, earache or in pain).       Have a small injury (sprain, small cut, burn or broken bone).       Need a physical exam, Pap smear, vaccine or prescription refill.       Have questions about your health or medicines.    To reach us:      Call [phone] ([phone]). Open 24 hours every day. (For counseling services, call [phone].)    Log into [email or web address] at [email or web address]. (Visit [email or web address] to create an account.) Hospital emergency room    An emergency is a serious or life- threatening problem that must be treated right away.    Call [phone] or get to the hospital if you have:      Very bad or sudden:            - Chest pain or pressure         - Bleeding         - Head or belly pain         - Dizziness or trouble seeing, walking or                           Speaking      Problems breathing      Blood in your vomit or you are coughing up blood      A major injury (knocked out, loss of a finger or limb, rape, broken bone protruding from skin)    A mental health crisis. (Or call the Mental Health Crisis line at 1-338.201.8879 or Suicide Prevention Hotline at 1-853.672.1551.)    Open 24 hours every day. You don't need an appointment.     Urgent care    Visit urgent care for sickness or small injuries when the clinic is closed. You don't need an appointment. To check hours or find an urgent care near you, visit [email or web address]. Online care    Get online care from [facility] for more than 70 common problems, like colds, allergies and infections. Open 24 hours every day at:   [email or web address]   Need help deciding?    For advice about where to be seen, you may call your clinic and ask to speak with a nurse. We're here for you 24 hours every day.         If you are deaf or hard of hearing, please let us know. We provide many free services including sign language interpreters, oral interpreters, TTYs, telephone amplifiers, note takers and written materials.                   Follow-ups after your visit        Who to contact     If you have questions or need follow up information about today's clinic visit or your schedule please contact [facility] directly at [phone].  Normal or non-critical lab and imaging results will be communicated to you by MyChart, letter or phone within 4 business days after the clinic has received the results. If you do not hear from us within 7 days, please contact the clinic through MyChart or phone. If you have a critical or abnormal lab result, we will notify you by phone as soon as possible.  Submit refill requests through NanoRacks or call your pharmacy and they will forward the refill request to us. Please allow 3 business days for your refill to be completed.          Additional Information About Your  "Visit        Medical Referral SourceharTacatÃ¬ Information     Apakau lets you send messages to your doctor, view your test results, renew your prescriptions, schedule appointments and more. To sign up, go to [email or web address] . Click on \"Log in\" on the left side of the screen, which will take you to the Welcome page. Then click on \"Sign up Now\" on the right side of the page.     You will be asked to enter the access code listed below, as well as some personal information. Please follow the directions to create your username and password.     Your access code is: [email or web address]  Expires: 5/10/2018  9:22 AM     Your access code will  in 90 days. If you need help or a new code, please call your [facility] clinic or [phone].        Care EveryWhere ID     This is your Care EveryWhere ID. This could be used by other organizations to access your [facility] medical records  [id number]        Your Vitals Were     Pulse Temperature Height Pulse Oximetry BMI (Body Mass Index)       80 97.6  F (36.4  C) (Oral) 5' 8\" (1.727 m) 99% 27.55 kg/m2        Blood Pressure from Last 3 Encounters:   18 130/80   17 134/84   16 133/60    Weight from Last 3 Encounters:   18 181 lb 3.2 oz (82.2 kg)   17 171 lb 2 oz (77.6 kg)   16 182 lb 4.8 oz (82.7 kg)              We Performed the Following     BASIC METABOLIC PANEL     Lipid panel reflex to direct LDL Fasting     TDAP VACCINE (ADACEL)          Today's Medication Changes          These changes are accurate as of 18  9:22 AM.  If you have any questions, ask your nurse or doctor.               These medicines have changed or have updated prescriptions.        Dose/Directions    hydrochlorothiazide 12.5 MG capsule   Commonly known as:  MICROZIDE   This may have changed:  See the new instructions.   Used for:  Hypertension goal BP (blood pressure) < 140/90   Changed by:  Trae Medina, DO        TAKE 1 CAPSULE BY MOUTH DAILY   Quantity:  90 capsule "   Refills:  1            Where to get your medicines      These medications were sent to [facility] [id number] [facility] - [place], [place] - [address]  [address], [place] [address]     Phone:  [phone]     hydrochlorothiazide 12.5 MG capsule                Primary Care Provider Office Phone # Fax #    Trae Medina [phone]       [facility] [address]        Equal Access to Services     LIZETT JOSEPH : Hadii aad ku hadasho Soomaali, waaxda luqadaha, qaybta kaalmada adeegyada, waxay idiin hayaan adeeg kharash lageoffrey . So [facility] [phone].    ATENCIÓN: Si habla español, tiene a espinosa disposición servicios gratuitos de asistencia lingüística. Llame al [phone].    We comply with applicable federal civil rights laws and [place] laws. We do not discriminate on the basis of race, color, national origin, age, disability, sex, sexual orientation, or gender identity.            Thank you!     Thank you for choosing [facility]  for your care. Our goal is always to provide you with excellent care. Hearing back from our patients is one way we can continue to improve our services. Please take a few minutes to complete the written survey that you may receive in the mail after your visit with us. Thank you!             Your Updated Medication List - Protect others around you: Learn how to safely use, store and throw away your medicines at www.disposemymeds.org.          This list is accurate as of 2/9/18  9:22 AM.  Always use your most recent med list.                   Brand Name Dispense Instructions for use Diagnosis    aspirin 81 MG EC tablet      Take 81 mg by mouth daily        hydrochlorothiazide 12.5 MG capsule    MICROZIDE    90 capsule    TAKE 1 CAPSULE BY MOUTH DAILY    Hypertension goal BP (blood pressure) < 140/90       latanoprost 0.005 % ophthalmic solution    XALATAN     Place 1 drop into both eyes At Bedtime

## 2018-02-09 NOTE — NURSING NOTE
Prior to injection verified patient identity using patient's name and date of birth.    Screening Questionnaire for Adult Immunization    Are you sick today?   No   Do you have allergies to medications, food, a vaccine component or latex?   Yes   Have you ever had a serious reaction after receiving a vaccination?   No   Do you have a long-term health problem with heart disease, lung disease, asthma, kidney disease, metabolic disease (e.g. diabetes), anemia, or other blood disorder?   No   Do you have cancer, leukemia, HIV/AIDS, or any other immune system problem?   No   In the past 3 months, have you taken medications that affect  your immune system, such as prednisone, other steroids, or anticancer drugs; drugs for the treatment of rheumatoid arthritis, Crohn s disease, or psoriasis; or have you had radiation treatments?   No   Have you had a seizure, or a brain or other nervous system problem?   No   During the past year, have you received a transfusion of blood or blood     products, or been given immune (gamma) globulin or antiviral drug?   No   For women: Are you pregnant or is there a chance you could become        pregnant during the next month?   No   Have you received any vaccinations in the past 4 weeks?   No     Immunization questionnaire was positive for at least one answer.  Notified Dr. Medina.        Per orders of Dr. Medina, injection of Adacel given by Trudy Romano. Patient instructed to remain in clinic for 15 minutes afterwards, and to report any adverse reaction to me immediately.       Screening performed by Trudy Romano on 2/9/2018 at 9:27 AM.

## 2018-02-09 NOTE — LETTER
[facility]  [address] [address] [phone]                                                                                                February 19, 2018    Patricia A Sowada  [address] [address]        Dear Ms. Sowada,    The results of your recent lab tests were within normal limits. Enclosed is a copy of these results.  If you have any further questions or problems, please contact our office.    Results for orders placed or performed in visit on 02/09/18   BASIC METABOLIC PANEL   Result Value Ref Range    Sodium 139 133 - 144 mmol/L    Potassium 3.9 3.4 - 5.3 mmol/L    Chloride 106 94 - 109 mmol/L    Carbon Dioxide 24 20 - 32 mmol/L    Anion Gap 9 3 - 14 mmol/L    Glucose 106 (H) 70 - 99 mg/dL    Urea Nitrogen 15 7 - 30 mg/dL    Creatinine 0.65 0.52 - 1.04 mg/dL    GFR Estimate 88 >60 mL/min/1.7m2    GFR Estimate If Black >90 >60 mL/min/1.7m2    Calcium 9.3 8.5 - 10.1 mg/dL   Lipid panel reflex to direct LDL Fasting   Result Value Ref Range    Cholesterol 174 <200 mg/dL    Triglycerides 80 <150 mg/dL    HDL Cholesterol 45 (L) >49 mg/dL    LDL Cholesterol Calculated 113 (H) <100 mg/dL    Non HDL Cholesterol 129 <130 mg/dL         Sincerely,      Trae Medina DO/viola

## 2018-02-09 NOTE — PROGRESS NOTES
"  SUBJECTIVE:   Patricia A Sowada is a 72 year old female who presents to clinic today for the following health issues:      Hypertension Follow-up      Outpatient blood pressures are being checked at home- sometimes.  Results are \" good\".    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  Additional history: as documented    Patient Active Problem List   Diagnosis     Impaired fasting glucose     Overweight     CARDIOVASCULAR SCREENING; LDL GOAL LESS THAN 130     Hypertension goal BP (blood pressure) < 140/90     Seborrheic keratosis     Advanced directives, counseling/discussion     Colon polyps     Morning joint stiffness     Diverticular disease of colon     Uterine leiomyoma, unspecified location     Acute cholecystitis     Past Surgical History:   Procedure Laterality Date     BUNIONECTOMY RT/LT  2001    right     C APPENDECTOMY  1950     C NONSPECIFIC PROCEDURE      hammer toe repair, bilat     COLONOSCOPY N/A 10/9/2015    Procedure: COLONOSCOPY;  Surgeon: Aldo Hurtado MD;  [facility]: [facility]     TUBAL LIGATION         Social History   Substance Use Topics     Smoking status: Never Smoker     Smokeless tobacco: Never Used     Alcohol use No     Family History   Problem Relation Age of Onset     CANCER Father      lymphoma     HEART DISEASE Mother      valve replacement     Breast Cancer Sister      52 y/o     Hypertension Brother      Hypertension Brother      DIABETES No family hx of            Reviewed and updated as needed this visit by clinical staff  Tobacco  Allergies  Meds  Med Hx  " "Surg Hx  Fam Hx  Soc Hx      Reviewed and updated as needed this visit by Provider         ROS:  Constitutional, HEENT, cardiovascular, pulmonary, GI, , musculoskeletal, neuro, skin, endocrine and psych systems are negative, except as otherwise noted.    OBJECTIVE:     /80 (BP Location: Right arm, Patient Position: Sitting, Cuff Size: Adult Large)  Pulse 80  Temp 97.6  F (36.4  C) (Oral)  Ht 5' 8\" (1.727 m)  Wt 181 lb 3.2 oz (82.2 kg)  SpO2 99%  BMI 27.55 kg/m2  Body mass index is 27.55 kg/(m^2).  GENERAL: healthy, alert and no distress  NECK: no adenopathy, no asymmetry, masses, or scars and thyroid normal to palpation  RESP: lungs clear to auscultation - no rales, rhonchi or wheezes  CV: regular rate and rhythm, normal S1 S2, no S3 or S4, no murmur, click or rub, no peripheral edema and peripheral pulses strong  ABDOMEN: soft, nontender, no hepatosplenomegaly, no masses and bowel sounds normal  MS: no gross musculoskeletal defects noted, no edema    Diagnostic Test Results:  No results found for this or any previous visit (from the past 24 hour(s)).    ASSESSMENT/PLAN:       ICD-10-CM    1. Hypertension goal BP (blood pressure) < 140/90 I10 BASIC METABOLIC PANEL     hydrochlorothiazide (MICROZIDE) 12.5 MG capsule   2. At risk for falling Z91.81    3. Need for prophylactic vaccination with tetanus-diphtheria (TD) Z23 TDAP VACCINE (ADACEL)   4. Lipid screening Z13.220 Lipid panel reflex to direct LDL Fasting   5. Grief reaction F43.20      htn-stable, cont meds, check fasting labs  grieving ,  passed away, she is doing well, has lots of social support, works still  Working on weight loss, diet and exercise  Recent wrist surgery, doing well       See Patient Instructions    Trae Medina, DO  [facility]    "

## 2018-02-10 LAB
ANION GAP SERPL CALCULATED.3IONS-SCNC: 9 MMOL/L (ref 3–14)
BUN SERPL-MCNC: 15 MG/DL (ref 7–30)
CALCIUM SERPL-MCNC: 9.3 MG/DL (ref 8.5–10.1)
CHLORIDE SERPL-SCNC: 106 MMOL/L (ref 94–109)
CHOLEST SERPL-MCNC: 174 MG/DL
CO2 SERPL-SCNC: 24 MMOL/L (ref 20–32)
CREAT SERPL-MCNC: 0.65 MG/DL (ref 0.52–1.04)
GFR SERPL CREATININE-BSD FRML MDRD: 88 ML/MIN/1.7M2
GLUCOSE SERPL-MCNC: 106 MG/DL (ref 70–99)
HDLC SERPL-MCNC: 45 MG/DL
LDLC SERPL CALC-MCNC: 113 MG/DL
NONHDLC SERPL-MCNC: 129 MG/DL
POTASSIUM SERPL-SCNC: 3.9 MMOL/L (ref 3.4–5.3)
SODIUM SERPL-SCNC: 139 MMOL/L (ref 133–144)
TRIGL SERPL-MCNC: 80 MG/DL

## 2018-08-08 ENCOUNTER — OFFICE VISIT (OUTPATIENT)
Dept: FAMILY MEDICINE | Facility: CLINIC | Age: 73
End: 2018-08-08
Payer: MEDICARE

## 2018-08-08 VITALS
HEART RATE: 80 BPM | DIASTOLIC BLOOD PRESSURE: 82 MMHG | TEMPERATURE: 98.1 F | OXYGEN SATURATION: 99 % | HEIGHT: 68 IN | BODY MASS INDEX: 25.46 KG/M2 | SYSTOLIC BLOOD PRESSURE: 118 MMHG | WEIGHT: 168 LBS

## 2018-08-08 DIAGNOSIS — I10 HYPERTENSION GOAL BP (BLOOD PRESSURE) < 140/90: ICD-10-CM

## 2018-08-08 DIAGNOSIS — Z12.31 VISIT FOR SCREENING MAMMOGRAM: ICD-10-CM

## 2018-08-08 DIAGNOSIS — Z01.818 PREOP GENERAL PHYSICAL EXAM: Primary | ICD-10-CM

## 2018-08-08 DIAGNOSIS — H25.013 CORTICAL AGE-RELATED CATARACT OF BOTH EYES: ICD-10-CM

## 2018-08-08 DIAGNOSIS — K57.30 DIVERTICULAR DISEASE OF COLON: ICD-10-CM

## 2018-08-08 LAB — HGB BLD-MCNC: 15.2 G/DL (ref 11.7–15.7)

## 2018-08-08 PROCEDURE — 36415 COLL VENOUS BLD VENIPUNCTURE: CPT | Performed by: FAMILY MEDICINE

## 2018-08-08 PROCEDURE — 85018 HEMOGLOBIN: CPT | Performed by: FAMILY MEDICINE

## 2018-08-08 PROCEDURE — 99214 OFFICE O/P EST MOD 30 MIN: CPT | Performed by: FAMILY MEDICINE

## 2018-08-08 PROCEDURE — 80048 BASIC METABOLIC PNL TOTAL CA: CPT | Performed by: FAMILY MEDICINE

## 2018-08-08 RX ORDER — HYDROCHLOROTHIAZIDE 12.5 MG/1
CAPSULE ORAL
Qty: 90 CAPSULE | Refills: 1 | Status: SHIPPED | OUTPATIENT
Start: 2018-08-08 | End: 2019-02-21

## 2018-08-08 ASSESSMENT — ANXIETY QUESTIONNAIRES
1. FEELING NERVOUS, ANXIOUS, OR ON EDGE: NOT AT ALL
7. FEELING AFRAID AS IF SOMETHING AWFUL MIGHT HAPPEN: NOT AT ALL
3. WORRYING TOO MUCH ABOUT DIFFERENT THINGS: NOT AT ALL
IF YOU CHECKED OFF ANY PROBLEMS ON THIS QUESTIONNAIRE, HOW DIFFICULT HAVE THESE PROBLEMS MADE IT FOR YOU TO DO YOUR WORK, TAKE CARE OF THINGS AT HOME, OR GET ALONG WITH OTHER PEOPLE: NOT DIFFICULT AT ALL
2. NOT BEING ABLE TO STOP OR CONTROL WORRYING: NOT AT ALL
GAD7 TOTAL SCORE: 0
5. BEING SO RESTLESS THAT IT IS HARD TO SIT STILL: NOT AT ALL
6. BECOMING EASILY ANNOYED OR IRRITABLE: NOT AT ALL

## 2018-08-08 ASSESSMENT — PATIENT HEALTH QUESTIONNAIRE - PHQ9: 5. POOR APPETITE OR OVEREATING: NOT AT ALL

## 2018-08-08 NOTE — LETTER
[facility]  [address] [address] [phone]                                                                                                August 10, 2018    Myla A Juan Manuelshlomo  [address] [address]        Dear Ms. Sowada,    Your recent lab results were within normal limits. A copy of those results are included with this letter.        Sincerely,      Trae Medina DO/hl    Results for orders placed or performed in visit on 08/08/18   Basic metabolic panel   Result Value Ref Range    Sodium 138 133 - 144 mmol/L    Potassium 5.0 3.4 - 5.3 mmol/L    Chloride 102 94 - 109 mmol/L    Carbon Dioxide 27 20 - 32 mmol/L    Anion Gap 9 3 - 14 mmol/L    Glucose 105 (H) 70 - 99 mg/dL    Urea Nitrogen 11 7 - 30 mg/dL    Creatinine 0.66 0.52 - 1.04 mg/dL    GFR Estimate 88 >60 mL/min/1.7m2    GFR Estimate If Black >90 >60 mL/min/1.7m2    Calcium 8.9 8.5 - 10.1 mg/dL   Hemoglobin   Result Value Ref Range    Hemoglobin 15.2 11.7 - 15.7 g/dL

## 2018-08-08 NOTE — PATIENT INSTRUCTIONS
Labs today. I will notify you of results when I receive them.   Follow up mammogram as planned.   Refill medication today. Continue taking current medications. Hold aspirin 81mg one week prior to procedure.   Proceed with surgery as planned.   Before Your Surgery      Call your surgeon if there is any change in your health. This includes signs of a cold or flu (such as a sore throat, runny nose, cough, rash or fever).    Do not smoke, drink alcohol or take over the counter medicine (unless your surgeon or primary care doctor tells you to) for the 24 hours before and after surgery.    If you take prescribed drugs: Follow your doctor s orders about which medicines to take and which to stop until after surgery.    Eating and drinking prior to surgery: follow the instructions from your surgeon    Take a shower or bath the night before surgery. Use the soap your surgeon gave you to gently clean your skin. If you do not have soap from your surgeon, use your regular soap. Do not shave or scrub the surgery site.  Wear clean pajamas and have clean sheets on your bed.

## 2018-08-08 NOTE — MR AVS SNAPSHOT
After Visit Summary   8/8/2018    Patricia A Sowada    MRN: [id number]           Patient Information     Date Of Birth          1945        Visit Information        Provider Department      8/8/2018 9:20 AM Trae Medina DO [facility]        Today's Diagnoses     Preop general physical exam    -  1    Cortical age-related cataract of both eyes        Hypertension goal BP (blood pressure) < 140/90        Visit for screening mammogram        Diverticular disease of colon          Care Instructions    Labs today. I will notify you of results when I receive them.   Follow up mammogram as planned.   Refill medication today. Continue taking current medications. Hold aspirin 81mg one week prior to procedure.   Proceed with surgery as planned.   Before Your Surgery      Call your surgeon if there is any change in your health. This includes signs of a cold or flu (such as a sore throat, runny nose, cough, rash or fever).    Do not smoke, drink alcohol or take over the counter medicine (unless your surgeon or primary care doctor tells you to) for the 24 hours before and after surgery.    If you take prescribed drugs: Follow your doctor s orders about which medicines to take and which to stop until after surgery.    Eating and drinking prior to surgery: follow the instructions from your surgeon    Take a shower or bath the night before surgery. Use the soap your surgeon gave you to gently clean your skin. If you do not have soap from your surgeon, use your regular soap. Do not shave or scrub the surgery site.  Wear clean pajamas and have clean sheets on your bed.           Follow-ups after your visit        Future tests that were ordered for you today     Open Future Orders        Priority Expected Expires Ordered    MA SCREENING DIGITAL BILAT - Future  (s+30) Routine  8/8/2019 8/8/2018            Who to contact     If you have questions or need follow up information about  "today's clinic visit or your schedule please contact [facility] directly at [phone].  Normal or non-critical lab and imaging results will be communicated to you by MyChart, letter or phone within 4 business days after the clinic has received the results. If you do not hear from us within 7 days, please contact the clinic through MyChart or phone. If you have a critical or abnormal lab result, we will notify you by phone as soon as possible.  Submit refill requests through [employer] or call your pharmacy and they will forward the refill request to us. Please allow 3 business days for your refill to be completed.          Additional Information About Your Visit        Care EveryWhere ID     This is your Care EveryWhere ID. This could be used by other organizations to access your [facility] medical records  [id number]        Your Vitals Were     Pulse Temperature Height Pulse Oximetry Breastfeeding? BMI (Body Mass Index)    80 98.1  F (36.7  C) (Oral) 5' 8\" (1.727 m) 99% No 25.54 kg/m2       Blood Pressure from Last 3 Encounters:   08/08/18 118/82   02/09/18 130/80   04/12/17 134/84    Weight from Last 3 Encounters:   08/08/18 168 lb (76.2 kg)   02/09/18 181 lb 3.2 oz (82.2 kg)   04/12/17 171 lb 2 oz (77.6 kg)              We Performed the Following     Basic metabolic panel     Hemoglobin          Where to get your medicines      These medications were sent to [place] [address] [facility]  [address]  [address] [place] [address]     Phone:  [phone]     hydrochlorothiazide 12.5 MG capsule          Primary Care Provider Office Phone # Fax #    Trae Corteskyasharifa DO [place] [phone]       [address] [place] [address]        Equal Access to Services     LIZETT JOSEPH : Gretchen Whitehead, azul fritz, qajessie kaalmini hamlin. [facility] [phone].    ATENCIÓN: Si liseth valdes " disposición servicios gratuitos de asistencia lingüística. Sayra ridley [phone].    We comply with applicable federal civil rights laws and [place] laws. We do not discriminate on the basis of race, color, national origin, age, disability, sex, sexual orientation, or gender identity.            Thank you!     Thank you for choosing [facility]  for your care. Our goal is always to provide you with excellent care. Hearing back from our patients is one way we can continue to improve our services. Please take a few minutes to complete the written survey that you may receive in the mail after your visit with us. Thank you!             Your Updated Medication List - Protect others around you: Learn how to safely use, store and throw away your medicines at www.disposemymeds.org.          This list is accurate as of 8/8/18  9:50 AM.  Always use your most recent med list.                   Brand Name Dispense Instructions for use Diagnosis    aspirin 81 MG EC tablet      Take 81 mg by mouth daily        hydrochlorothiazide 12.5 MG capsule    MICROZIDE    90 capsule    TAKE 1 CAPSULE BY MOUTH DAILY    Hypertension goal BP (blood pressure) < 140/90       latanoprost 0.005 % ophthalmic solution    XALATAN     Place 1 drop into both eyes At Bedtime

## 2018-08-08 NOTE — PROGRESS NOTES
[address] [id number] [phone]  Dept: [phone]    PRE-OP EVALUATION:  Today's date: 2018    Patricia A Sowada (: 1945) presents for pre-operative evaluation assessment as requested by Dr. Coffey.  She requires evaluation and anesthesia risk assessment prior to undergoing surgery/procedure for treatment of both eyes .    Proposed Surgery/ Procedure: 2018, 2018  Date of Surgery/ Procedure: cataract removal   Time of Surgery/ Procedure: 12 pm  Hospital/Surgical Facility: [facility]  Fax number for surgical facility: [phone]  Primary Physician: Trae Medina  Type of Anesthesia Anticipated: Local    Patient has a Health Care Directive or Living Will:  YES     1. NO - Do you have a history of heart attack, stroke, stent, bypass or surgery on an artery in the head, neck, heart or legs?  2. NO - Do you ever have any pain or discomfort in your chest?  3. NO - Do you have a history of  Heart Failure?  4. NO - Are you troubled by shortness of breath when: walking on the level, up a slight hill or at night?  5. NO - Do you currently have a cold, bronchitis or other respiratory infection?  6. NO - Do you have a cough, shortness of breath or wheezing?  7. NO - Do you sometimes get pains in the calves of your legs when you walk?  8. NO - Do you or anyone in your family have previous history of blood clots?  9. NO - Do you or does anyone in your family have a serious bleeding problem such as prolonged bleeding following surgeries or cuts?  10. NO - Have you ever had problems with anemia or been told to take iron pills?  11. NO - Have you had any abnormal blood loss such as black, tarry or bloody stools, or abnormal vaginal bleeding?  12. NO - Have you ever had a blood transfusion?  13. YES - HAVE YOU OR ANY OF YOUR RELATIVES EVER HAD PROBLEMS WITH ANESTHESIA? Personal history  14. NO - Do you have sleep apnea,  excessive snoring or daytime drowsiness?  15. NO - Do you have any prosthetic heart valves?  16. NO - Do you have prosthetic joints?  17. NO - Is there any chance that you may be pregnant?      HPI:     HPI related to upcoming procedure:     Patient stated that she will complete right eye cataract eye surgery on 8/16, then complete left eye cataract eye surgery on 9/6.      Medical history and current medication use reviewed and discussed with patient. All medications on medication list are taken by patient. Patient is taking aspirin 81mg for prevention and not treatment.  Denies vaginal bleeding, shortness of breath, chest pain, cold like symptoms, or fever.   She stated that she is losing weight intentionally and doing weight watchers. She stated that she feels better and is more active now.         See problem list for active medical problems.  Problems all longstanding and stable, except as noted/documented.  See ROS for pertinent symptoms related to these conditions.                                                                                                                                                          .    MEDICAL HISTORY:     Patient Active Problem List    Diagnosis Date Noted     Advanced directives, counseling/discussion 10/13/2011     Priority: High     Advance Care Planning:   Receipt of ACP document:  Received: Health Care Directive which was witnessed or notarized on 2/16/13.  Document not previously scanned.  Validation form completed and sent with document to be scanned.  Code Status needs to be updated to reflect choices in most recent ACP document. Orders placed.  Confirmed/documented designated decision maker(s). See permanent comments section of demographics in clinical tab. View document(s) and details by clicking on code status.   Added by Darci Teran on 5/1/2013.         Acute cholecystitis 12/18/2016     Priority: Medium     Uterine leiomyoma, unspecified location  11/10/2015     Priority: Medium     Diverticular disease of colon 10/13/2014     Priority: Medium     Morning joint stiffness 10/24/2013     Priority: Medium     Colon polyps 10/15/2012     Priority: Medium     At next colonoscopy, needs to be done in hospital due to anesthesia reactions.        Seborrheic keratosis 03/14/2011     Priority: Medium     (Problem list name updated by automated process. Provider to review and confirm.)       Hypertension goal BP (blood pressure) < 140/90 09/02/2010     Priority: Medium     CARDIOVASCULAR SCREENING; LDL GOAL LESS THAN 130 05/09/2010     Priority: Medium     Overweight 02/26/2010     Priority: Medium     Problem list name updated by automated process. Provider to review       Impaired fasting glucose 11/25/2008     Priority: Medium      Past Medical History:   Diagnosis Date     Bell's palsy 10/2001     COPD (chronic obstructive pulmonary disease) (H)      HTN (hypertension)      PONV (postoperative nausea and vomiting)      Past Surgical History:   Procedure Laterality Date     BUNIONECTOMY RT/LT  6/2001    right     C APPENDECTOMY  1950     C NONSPECIFIC PROCEDURE  1996    hammer toe repair, bilat     COLONOSCOPY N/A 10/9/2015    Procedure: COLONOSCOPY;  Surgeon: Aldo Hurtado MD;  Location:  GI     TUBAL LIGATION  1980     Current Outpatient Prescriptions   Medication Sig Dispense Refill     aspirin 81 MG EC tablet Take 81 mg by mouth daily       hydrochlorothiazide (MICROZIDE) 12.5 MG capsule TAKE 1 CAPSULE BY MOUTH DAILY 90 capsule 1     latanoprost (XALATAN) 0.005 % ophthalmic solution Place 1 drop into both eyes At Bedtime  0     [DISCONTINUED] hydrochlorothiazide (MICROZIDE) 12.5 MG capsule TAKE 1 CAPSULE BY MOUTH DAILY 90 capsule 1     OTC products: None, except as noted above    Allergies   Allergen Reactions     Penicillins Rash      Latex Allergy: NO    Social History   Substance Use Topics     Smoking status: Never Smoker     Smokeless  "tobacco: Never Used     Alcohol use No     History   Drug Use No       REVIEW OF SYSTEMS:   10 point ROS of systems including Constitutional, Eyes, Respiratory, Cardiovascular, Gastroenterology, Genitourinary, Integumentary, Muscularskeletal, Psychiatric were all negative except for pertinent positives noted in my HPI.    This document serves as a record of the services and decisions personally performed and made by Trae Medina D.O. It was created on her behalf by Kodak Rowell, a trained medical scribe. The creation of this document is based on the provider's statements to the medical scribe.  Kodak Rowell August 8, 2018 9:28 AM      EXAM:   /82  Pulse 80  Temp 98.1  F (36.7  C) (Oral)  Ht 1.727 m (5' 8\")  Wt 76.2 kg (168 lb)  SpO2 99%  Breastfeeding? No  BMI 25.54 kg/m2    GENERAL APPEARANCE: healthy, alert and no distress     EYES: EOMI, PERRL     HENT: ear canals and TM's normal and nose and mouth without ulcers or lesions     NECK: no adenopathy, no asymmetry, masses, or scars and thyroid normal to palpation     RESP: lungs clear to auscultation - no rales, rhonchi or wheezes     CV: regular rates and rhythm, normal S1 S2, no S3 or S4 and no murmur, click or rub     ABDOMEN:  soft, nontender, no HSM or masses and bowel sounds normal     MS: extremities normal- no gross deformities noted, no evidence of inflammation in joints, FROM in all extremities.     SKIN: no suspicious lesions or rashes     NEURO: Normal strength and tone, sensory exam grossly normal, mentation intact and speech normal     PSYCH: mentation appears normal. and affect normal/bright     LYMPHATICS: No cervical adenopathy    DIAGNOSTICS:     EKG: Not indicated due to non-vascular surgery and low risk of event (age <65 and without cardiac risk factors)  Labs Drawn and in Process:   Unresulted Labs Ordered in the Past 30 Days of this Admission     No orders found from 6/9/2018 to 8/9/2018.          Recent Labs   Lab Test  " 02/09/18   0925  12/25/16   0720  12/24/16   0738   04/02/15   0920   HGB   --   12.4  13.2   < >  15.0   PLT   --   340  311   < >   --    NA  139  139  139   < >  142   POTASSIUM  3.9  3.8  3.7   < >  4.0   CR  0.65  0.61  0.62   < >  0.64   A1C   --    --    --    --   5.4    < > = values in this interval not displayed.        IMPRESSION:   Reason for surgery/procedure: vision changes/ cataract eye surgery of both eyes   Diagnosis/reason for consult: surgical clearance.     The proposed surgical procedure is considered LOW risk.    REVISED CARDIAC RISK INDEX  The patient has the following serious cardiovascular risks for perioperative complications such as (MI, PE, VFib and 3  AV Block):  No serious cardiac risks  INTERPRETATION: 0 risks: Class I (very low risk - 0.4% complication rate)    The patient has the following additional risks for perioperative complications:  No identified additional risks      ICD-10-CM    1. Preop general physical exam Z01.818    2. Cortical age-related cataract of both eyes H25.013    3. Hypertension goal BP (blood pressure) < 140/90 I10 hydrochlorothiazide (MICROZIDE) 12.5 MG capsule     Basic metabolic panel     Hemoglobin   4. Visit for screening mammogram Z12.31 MA SCREENING DIGITAL BILAT - Future  (s+30)   5. Diverticular disease of colon K57.30      Stable medical conditions  Cleared for surgery   RECOMMENDATIONS:       Cardiovascular Risk  Performs 4 METs exercise without symptoms (Light housework (dusting, washing dishes), Climb a flight of stairs, Walk on level ground at 15 minutes per mile (4 miles/hour) and Shoveling) .       --Patient is to take all scheduled medications on the day of surgery EXCEPT for modifications listed below.    Anticoagulant or Antiplatelet Medication Use  ASPIRIN: hold aspirin 81mg one week prior to procedure.         APPROVAL GIVEN to proceed with proposed procedure, without further diagnostic evaluation     The information in this document,  created by the medical scribe for me, accurately reflects the services I personally performed and the decisions made by me. I have reviewed and approved this document for accuracy prior to leaving the patient care area.  August 8, 2018 9:43 AM    Signed Electronically by: Trae Medina DO    Copy of this evaluation report is provided to requesting physician.    [facility] Preop Guidelines    Revised Cardiac Risk Index

## 2018-08-09 LAB
ANION GAP SERPL CALCULATED.3IONS-SCNC: 9 MMOL/L (ref 3–14)
BUN SERPL-MCNC: 11 MG/DL (ref 7–30)
CALCIUM SERPL-MCNC: 8.9 MG/DL (ref 8.5–10.1)
CHLORIDE SERPL-SCNC: 102 MMOL/L (ref 94–109)
CO2 SERPL-SCNC: 27 MMOL/L (ref 20–32)
CREAT SERPL-MCNC: 0.66 MG/DL (ref 0.52–1.04)
GFR SERPL CREATININE-BSD FRML MDRD: 88 ML/MIN/1.7M2
GLUCOSE SERPL-MCNC: 105 MG/DL (ref 70–99)
POTASSIUM SERPL-SCNC: 5 MMOL/L (ref 3.4–5.3)
SODIUM SERPL-SCNC: 138 MMOL/L (ref 133–144)

## 2018-08-10 ASSESSMENT — ANXIETY QUESTIONNAIRES: GAD7 TOTAL SCORE: 0

## 2018-08-10 ASSESSMENT — PATIENT HEALTH QUESTIONNAIRE - PHQ9: SUM OF ALL RESPONSES TO PHQ QUESTIONS 1-9: 0

## 2018-08-16 ENCOUNTER — ALLIED HEALTH/NURSE VISIT (OUTPATIENT)
Dept: NURSING | Facility: CLINIC | Age: 73
End: 2018-08-16
Payer: MEDICARE

## 2018-08-16 ENCOUNTER — TELEPHONE (OUTPATIENT)
Dept: FAMILY MEDICINE | Facility: CLINIC | Age: 73
End: 2018-08-16

## 2018-08-16 DIAGNOSIS — I47.10 SVT (SUPRAVENTRICULAR TACHYCARDIA) (H): Primary | ICD-10-CM

## 2018-08-16 DIAGNOSIS — I47.10 SVT (SUPRAVENTRICULAR TACHYCARDIA) (H): ICD-10-CM

## 2018-08-16 PROCEDURE — 99207 ZZC NO CHARGE LOS: CPT

## 2018-08-16 PROCEDURE — 0296T ZIO PATCH HOLTER: CPT

## 2018-08-16 NOTE — TELEPHONE ENCOUNTER
Reason for Call:  Other     Detailed comments: Patient daughter stated that patient had surgery and during surgery patient had racing heart. Patient was told to follow up with pcp, however, pcp next available not until 9/4/18. Patient is scheduled with Priti Calles. Patient daughter requesting for nurse to call. Please advise.     Phone Number Patient can be reached at: Home number on file [phone] (home)    Best Time: Anytime    Can we leave a detailed message on this number? YES    Call taken on 8/16/2018 at 3:30 PM by Tamera Lim

## 2018-08-16 NOTE — TELEPHONE ENCOUNTER
Called daughter- scheduled with PCP on 8/20 and cancelled other appt. She feels fine currently.   During the cataract surgery she had SVT- HR-150. [facility]'s notes are visible. Daughter was told to get a holter and is concerned and was hoping to have it placed today so she would have more results next week. Martin, ordered Zio and since I was unable to find staff to place it, I referred her to [facility] to see if they are able to place it tonight per a recommendation.      Patricia Ortiz RN

## 2018-08-16 NOTE — MR AVS SNAPSHOT
After Visit Summary   8/16/2018    Patricia A Sowada    MRN: [id number]           Patient Information     Date Of Birth          1945        Visit Information        Provider Department      8/16/2018 4:30 PM FZ ANCILLARY [facility]        Today's Diagnoses     SVT (supraventricular tachycardia) (H)           Follow-ups after your visit        Your next 10 appointments already scheduled     Aug 20, 2018  9:40 AM CDT   SHORT with Trae Medina DO   [facility]    [address] [place] [address] [phone]              Who to contact     If you have questions or need follow up information about today's clinic visit or your schedule please contact [facility] directly at [phone].  Normal or non-critical lab and imaging results will be communicated to you by MyChart, letter or phone within 4 business days after the clinic has received the results. If you do not hear from us within 7 days, please contact the clinic through MyChart or phone. If you have a critical or abnormal lab result, we will notify you by phone as soon as possible.  Submit refill requests through MediaPhy or call your pharmacy and they will forward the refill request to us. Please allow 3 business days for your refill to be completed.          Additional Information About Your Visit        Care EveryWhere ID     This is your Care EveryWhere ID. This could be used by other organizations to access your [facility] medical records  [id number]         Blood Pressure from Last 3 Encounters:   08/08/18 118/82   02/09/18 130/80   04/12/17 134/84    Weight from Last 3 Encounters:   08/08/18 168 lb (76.2 kg)   02/09/18 181 lb 3.2 oz (82.2 kg)   04/12/17 171 lb 2 oz (77.6 kg)              We Performed the Following     Zio Patch Holter        Primary Care Provider Office Phone # Fax #    Trae Medina [phone]  [phone]       [address] [facility] [address]        Equal Access to Services     LIZETT JOSEPH : Hadii aad ku hadanisabeckie Sozhanna, warebeccada lumaximinoadaha, qaybta karaeannda daríoamandageorgi, mini dykesmarcelatanner serra. So [facility] [phone].    ATENCIÓN: Si habla español, tiene a espinosa disposición servicios gratuitos de asistencia lingüística. Llame al [phone].    We comply with applicable federal civil rights laws and [place] laws. We do not discriminate on the basis of race, color, national origin, age, disability, sex, sexual orientation, or gender identity.            Thank you!     Thank you for choosing [facility]  for your care. Our goal is always to provide you with excellent care. Hearing back from our patients is one way we can continue to improve our services. Please take a few minutes to complete the written survey that you may receive in the mail after your visit with us. Thank you!             Your Updated Medication List - Protect others around you: Learn how to safely use, store and throw away your medicines at www.disposemymeds.org.          This list is accurate as of 8/16/18  5:13 PM.  Always use your most recent med list.                   Brand Name Dispense Instructions for use Diagnosis    aspirin 81 MG EC tablet      Take 81 mg by mouth daily        hydrochlorothiazide 12.5 MG capsule    MICROZIDE    90 capsule    TAKE 1 CAPSULE BY MOUTH DAILY    Hypertension goal BP (blood pressure) < 140/90       latanoprost 0.005 % ophthalmic solution    XALATAN     Place 1 drop into both eyes At Bedtime

## 2018-08-20 ENCOUNTER — OFFICE VISIT (OUTPATIENT)
Dept: FAMILY MEDICINE | Facility: CLINIC | Age: 73
End: 2018-08-20
Payer: MEDICARE

## 2018-08-20 VITALS
SYSTOLIC BLOOD PRESSURE: 132 MMHG | HEIGHT: 68 IN | DIASTOLIC BLOOD PRESSURE: 76 MMHG | HEART RATE: 86 BPM | OXYGEN SATURATION: 99 % | WEIGHT: 166 LBS | TEMPERATURE: 97.9 F | BODY MASS INDEX: 25.16 KG/M2

## 2018-08-20 DIAGNOSIS — R73.9 BLOOD GLUCOSE ELEVATED: ICD-10-CM

## 2018-08-20 DIAGNOSIS — Z13.29 SCREENING FOR THYROID DISORDER: ICD-10-CM

## 2018-08-20 DIAGNOSIS — I10 HYPERTENSION GOAL BP (BLOOD PRESSURE) < 140/90: ICD-10-CM

## 2018-08-20 DIAGNOSIS — Z12.31 VISIT FOR SCREENING MAMMOGRAM: ICD-10-CM

## 2018-08-20 DIAGNOSIS — R00.2 PALPITATIONS: Primary | ICD-10-CM

## 2018-08-20 LAB — HBA1C MFR BLD: 5.1 % (ref 0–5.6)

## 2018-08-20 PROCEDURE — 99214 OFFICE O/P EST MOD 30 MIN: CPT | Performed by: FAMILY MEDICINE

## 2018-08-20 PROCEDURE — 83036 HEMOGLOBIN GLYCOSYLATED A1C: CPT | Performed by: FAMILY MEDICINE

## 2018-08-20 PROCEDURE — 36415 COLL VENOUS BLD VENIPUNCTURE: CPT | Performed by: FAMILY MEDICINE

## 2018-08-20 PROCEDURE — 84443 ASSAY THYROID STIM HORMONE: CPT | Performed by: FAMILY MEDICINE

## 2018-08-20 NOTE — PATIENT INSTRUCTIONS
Follow up with Cardiology as discussed today.   Labs today    [facility]   Discharged by : Tori Romano MA  Paper scripts provided to patient : None    If you have any questions regarding your visit please contact your care team:     Team Gold                Clinic Hours Telephone Number     Dr. Smitha Calles, CNP  Mikaela Byrneter, CNP 7am-7pm  Monday - Thursday   7am-5pm  Fridays  [phone]   (Appointment scheduling available 24/7)     RN Line  [phone] option 2     Urgent Care - [place] - 11am-9pm Monday-Friday Saturday-Sunday- 9am-5pm     [place] -   5pm-9pm Monday-Friday Saturday-Sunday- 9am-5pm    [phone] - [place]    [phone] - [place]       For a Price Quote for your services, please call our [employer] Price Line at [phone].     What options do I have for visits at the clinic other than the traditional office visit?     To expand how we care for you, many of our providers are utilizing electronic visits (e-visits) and telephone visits, when medically appropriate, for interactions with their patients rather than a visit in the clinic. We also offer nurse visits for many medical concerns. Just like any other service, we will bill your insurance company for this type of visit based on time spent on the phone with your provider. Not all insurance companies cover these visits. Please check with your medical insurance if this type of visit is covered. You will be responsible for any charges that are not paid by your insurance.   E-visits via EyeTechCare: generally incur a $35.00 fee.     Telephone visits:  Time spent on the phone: *charged based on time that is spent on the phone in increments of 10 minutes. Estimated cost:   5-10 mins $30.00   11-20 mins. $59.00   21-30 mins. $85.00       Use EyeTechCare (secure email communication and access to your chart) to send your primary  care provider a message or make an appointment. Ask someone on your Team how to sign up for [employer].     As always, Thank you for trusting us with your health care needs!      [facility]:    Scheduling Appointments  [place]  Call: [phone]    [facility]  Call: [phone]    [facility]  Call: [phone]    For Gastroenterology referrals   [facility] Gastroenterology   Clinics and Surgery [place], 4th Floor   [address] [place] [address]   Appointments: [phone]    WHERE TO GO FOR CARE?  Clinic    Make an appointment if you:       Are sick (cold, cough, flu, sore throat, earache or in pain).       Have a small injury (sprain, small cut, burn or broken bone).       Need a physical exam, Pap smear, vaccine or prescription refill.       Have questions about your health or medicines.    To reach us:      Call [phone] ([phone]). Open 24 hours every day. (For counseling services, call [phone].)    Log into [employer] at [email or web address]. (Visit [email or web address] to create an account.) Hospital emergency room    An emergency is a serious or life- threatening problem that must be treated right away.    Call 460 or get to the hospital if you have:      Very bad or sudden:            - Chest pain or pressure         - Bleeding         - Head or belly pain         - Dizziness or trouble seeing, walking or                          Speaking      Problems breathing      Blood in your vomit or you are coughing up blood      A major injury (knocked out, loss of a finger or limb, rape, broken bone protruding from skin)    A mental health crisis. (Or call the Mental Health Crisis line at 1-148.848.4711 or Suicide Prevention Hotline at 1-823.119.5210.)    Open 24 hours every day. You don't need an appointment.     Urgent care    Visit urgent care for sickness or small injuries when the clinic  is closed. You don't need an appointment. To check hours or find an urgent care near you, visit [email or web address]. Online care    Get online care from [facility] for more than 70 common problems, like colds, allergies and infections. Open 24 hours every day at:   [email or web address]   Need help deciding?    For advice about where to be seen, you may call your clinic and ask to speak with a nurse. We're here for you 24 hours every day.         If you are deaf or hard of hearing, please let us know. We provide many free services including sign language interpreters, oral interpreters, TTYs, telephone amplifiers, note takers and written materials.

## 2018-08-20 NOTE — MR AVS SNAPSHOT
After Visit Summary   8/20/2018    Patricia A Sowada    MRN: [id number]           Patient Information     Date Of Birth          1945        Visit Information        Provider Department      8/20/2018 9:40 AM Trae Medina DO [facility]        Today's Diagnoses     Palpitations    -  1    Blood glucose elevated        Visit for screening mammogram        Hypertension goal BP (blood pressure) < 140/90        Screening for thyroid disorder          Care Instructions    Follow up with Cardiology as discussed today.   Labs today    [facility]   Discharged by : Tori Romano MA  Paper scripts provided to patient : None    If you have any questions regarding your visit please contact your care team:     Team Gold                Clinic Hours Telephone Number     Dr. Smitha Calles, YONG Miguel, CNP 7am-7pm  Monday - Thursday   7am-5pm  Fridays  [phone]   (Appointment scheduling available 24/7)     RN Line  [phone] option 2     [facility] - [place] [facility] - 11am-9pm Monday-Friday Saturday-Sunday- 9am-5pm     [place] -   5pm-9pm Monday-Friday Saturday-Sunday- 9am-5pm    [phone] - [place]    [phone] - [place]       For a Price Quote for your services, please call our Consumer Price Line at [phone].     What options do I have for visits at the clinic other than the traditional office visit?     To expand how we care for you, many of our providers are utilizing electronic visits (e-visits) and telephone visits, when medically appropriate, for interactions with their patients rather than a visit in the clinic. We also offer nurse visits for many medical concerns. Just like any other service, we will bill your insurance company for this type of visit based on time spent on the phone with your provider. Not all insurance companies  cover these visits. Please check with your medical insurance if this type of visit is covered. You will be responsible for any charges that are not paid by your insurance.   E-visits via sickweatherhart: generally incur a $35.00 fee.     Telephone visits:  Time spent on the phone: *charged based on time that is spent on the phone in increments of 10 minutes. Estimated cost:   5-10 mins $30.00   11-20 mins. $59.00   21-30 mins. $85.00       Use [employer] (secure email communication and access to your chart) to send your primary care provider a message or make an appointment. Ask someone on your Team how to sign up for [employer]rib[employer].     As always, Thank you for trusting us with your health care needs!      [facility]:    Scheduling Appointments  [place] [place]  Call: [phone]    [facility]  Call: [phone]    [facility]  Call: [phone]    For Gastroenterology referrals   [facility] Gastroenterology   Clinics and Surgery Center, 4th Floor   [address] [place] [address]   Appointments: [phone]    WHERE TO GO FOR CARE?  Clinic    Make an appointment if you:       Are sick (cold, cough, flu, sore throat, earache or in pain).       Have a small injury (sprain, small cut, burn or broken bone).       Need a physical exam, Pap smear, vaccine or prescription refill.       Have questions about your health or medicines.    To reach us:      Call [phone] ([phone]). Open 24 hours every day. (For counseling services, call [phone].)    Log into CribFrog at [email or web address]. (Visit [email or web address] to create an account.) Hospital emergency room    An emergency is a serious or life- threatening problem that must be treated right away.    Call 247 or get to the hospital if you have:      Very bad or sudden:            - Chest pain or pressure         - Bleeding         - Head or belly pain         -  Dizziness or trouble seeing, walking or                          Speaking      Problems breathing      Blood in your vomit or you are coughing up blood      A major injury (knocked out, loss of a finger or limb, rape, broken bone protruding from skin)    A mental health crisis. (Or call the Mental Health Crisis line at 1-688.395.7387 or Suicide Prevention Hotline at 1-487.591.8168.)    Open 24 hours every day. You don't need an appointment.     Urgent care    Visit urgent care for sickness or small injuries when the clinic is closed. You don't need an appointment. To check hours or find an urgent care near you, visit [email or web address]. Online care    Get [facility] from [facility] for more than 70 common problems, like colds, allergies and infections. Open 24 hours every day at:   [email or web address]   Need help deciding?    For advice about where to be seen, you may call your clinic and ask to speak with a nurse. We're here for you 24 hours every day.         If you are deaf or hard of hearing, please let us know. We provide many free services including sign language interpreters, oral interpreters, TTYs, telephone amplifiers, note takers and written materials.                   Follow-ups after your visit        Additional Services     CARDIOLOGY EVAL ADULT REFERRAL       Preferred location:  [facility] [phone]   [email or web address]    Please be aware that coverage of these services is subject to the terms and limitations of your health insurance plan.  Call member services at your health plan with any benefit or coverage questions.      Please bring the following to your appointment:  Any x-rays, CTs or MRIs which have been performed. Contact the facility where they were done to arrange for  prior to your scheduled appointment.    List of current medications  This referral request   Any documents/labs given to you for this referral                 "  Future tests that were ordered for you today     Open Future Orders        Priority Expected Expires Ordered    MA SCREENING DIGITAL BILAT - Future  (s+30) Routine  8/20/2019 8/20/2018            Who to contact     If you have questions or need follow up information about today's clinic visit or your schedule please contact [facility] directly at [phone].  Normal or non-critical lab and imaging results will be communicated to you by MyChart, letter or phone within 4 business days after the clinic has received the results. If you do not hear from us within 7 days, please contact the clinic through MyChart or phone. If you have a critical or abnormal lab result, we will notify you by phone as soon as possible.  Submit refill requests through [email or web address] or call your pharmacy and they will forward the refill request to us. Please allow 3 business days for your refill to be completed.          Additional Information About Your Visit        Care EveryWhere ID     This is your Care EveryWhere ID. This could be used by other organizations to access your [facility] medical records  [id number]        Your Vitals Were     Pulse Temperature Height Pulse Oximetry Breastfeeding? BMI (Body Mass Index)    86 97.9  F (36.6  C) (Oral) 5' 8\" (1.727 m) 99% No 25.24 kg/m2       Blood Pressure from Last 3 Encounters:   08/20/18 140/79   08/08/18 118/82   02/09/18 130/80    Weight from Last 3 Encounters:   08/20/18 166 lb (75.3 kg)   08/08/18 168 lb (76.2 kg)   02/09/18 181 lb 3.2 oz (82.2 kg)              We Performed the Following     CARDIOLOGY EVAL ADULT REFERRAL     Hemoglobin A1c     TSH with free T4 reflex        Primary Care Provider Office Phone # Fax #    Trae Medina [phone]       [address] [facility] [address]        Equal Access to Services     LIZETT JOSEPH AH: Gretchen Whitehead, warebeccada luqadaha, qaybta kaalmada adeloli, mini chanel " emory robert ah. So [facility] [phone].    ATENCIÓN: Si tati zhong, tiene a espinosa disposición servicios gratuitos de asistencia lingüística. Sayra al [phone].    We comply with applicable federal civil rights laws and [place] laws. We do not discriminate on the basis of race, color, national origin, age, disability, sex, sexual orientation, or gender identity.            Thank you!     Thank you for choosing [facility]  for your care. Our goal is always to provide you with excellent care. Hearing back from our patients is one way we can continue to improve our services. Please take a few minutes to complete the written survey that you may receive in the mail after your visit with us. Thank you!             Your Updated Medication List - Protect others around you: Learn how to safely use, store and throw away your medicines at www.disposemymeds.org.          This list is accurate as of 8/20/18  9:57 AM.  Always use your most recent med list.                   Brand Name Dispense Instructions for use Diagnosis    aspirin 81 MG EC tablet      Take 81 mg by mouth daily        hydrochlorothiazide 12.5 MG capsule    MICROZIDE    90 capsule    TAKE 1 CAPSULE BY MOUTH DAILY    Hypertension goal BP (blood pressure) < 140/90       latanoprost 0.005 % ophthalmic solution    XALATAN     Place 1 drop into both eyes At Bedtime

## 2018-08-20 NOTE — LETTER
September 4, 2018      Patricia A Sowada  [address] [facility] [id number]        Dear Myla,       Your recent labs are normal, enclosed is a copy.     Results for orders placed or performed in visit on 08/20/18   TSH with free T4 reflex   Result Value Ref Range    TSH 0.77 0.40 - 4.00 mU/L   Hemoglobin A1c   Result Value Ref Range    Hemoglobin A1C 5.1 0 - 5.6 %         Please call if any questions      Sincerely,      Trae Medina DO/kb

## 2018-08-20 NOTE — PROGRESS NOTES
"  SUBJECTIVE:   Patricia A Sowada is a 73 year old female who presents to clinic today for the following health issues:      ED/UC Followup:    Facility:  [facility]  Date of visit: 8/16/2018  Reason for visit: cataract removal of right eye  Current Status: stable, would like to discuss heart issues during the procedure      She reports that she was experiencing symptoms of tachycardia during the operation. She notes that after being told to take in deep breath symptoms resolved. Patient reports that she experience  two to three episodes that day.   Denies dizziness or chest pain or shortness of breath or fatigue. She states that she feels fine.     She notes that she wore a Zio patch from Thursday to Sunday and experienced couple episodes of  \"slight flutter\"  that was very short lasting she did not have a chance to push the button on zio patch. She stated that yesterday was a \"perfect\" day. She attributes palpitations to anxiety or being \"excited\".     OF NOTE patient completed Stress ECHO on 4/18/17. She reports that she resumed taking aspirin and hydrochlorothiazide.  She stated that she has completed other procedures in the past and has not experienced symptoms of tachycardia.     Abnormal Glucose   Glucose levels were elevated at 105 on 8/8/18 and she reported that she was fasting that day.     Cataracts EYE  Patient reports that vision in the right eye is \"wonderful\" post cataract surgery, and plans to complete left cataract eye surgery on 9/6/18.     Surgical History   Patient reports that she completed tonsillectomy and adenoidectomy, and not appendectomy.           Problem list and histories reviewed & adjusted, as indicated.  Additional history: as documented    Patient Active Problem List   Diagnosis     Impaired fasting glucose     Overweight     CARDIOVASCULAR SCREENING; LDL GOAL LESS THAN 130     Hypertension goal BP (blood pressure) < 140/90     Seborrheic keratosis     Advanced " directives, counseling/discussion     Colon polyps     Morning joint stiffness     Diverticular disease of colon     Uterine leiomyoma, unspecified location     Acute cholecystitis     Past Surgical History:   Procedure Laterality Date     BUNIONECTOMY RT/LT  6/2001    right     C NONSPECIFIC PROCEDURE  1996    hammer toe repair, bilat     COLONOSCOPY N/A 10/9/2015    Procedure: COLONOSCOPY;  Surgeon: Aldo Hurtado MD;  [facility]: [facility]     TONSILLECTOMY  1950     TUBAL LIGATION  1980       Social History   Substance Use Topics     Smoking status: Never Smoker     Smokeless tobacco: Never Used     Alcohol use No     Family History   Problem Relation Age of Onset     Cancer Father      lymphoma     HEART DISEASE Mother      valve replacement     Breast Cancer Sister      52 y/o     Hypertension Brother      Hypertension Brother      Diabetes No family hx of          Current Outpatient Prescriptions   Medication Sig Dispense Refill     aspirin 81 MG EC tablet Take 81 mg by mouth daily       hydrochlorothiazide (MICROZIDE) 12.5 MG capsule TAKE 1 CAPSULE BY MOUTH DAILY 90 capsule 1     latanoprost (XALATAN) 0.005 % ophthalmic solution Place 1 drop into both eyes At Bedtime  0     Allergies   Allergen Reactions     Penicillins Rash     Recent Labs   Lab Test  08/20/18   1011  08/08/18   0952  02/09/18   0925  12/25/16   0720  12/24/16   0738  12/23/16   0735   11/10/16   1003   04/02/15   0920  10/13/14   0840   A1C  5.1   --    --    --    --    --    --    --    --   5.4   --    LDL   --    --   113*   --    --    --    --   107*   --    --   110   HDL   --    --   45*   --    --    --    --   44*   --    --   45*   TRIG   --    --   80   --    --    --    --   75   --    --   67   ALT   --    --    --   41  30  24   < >   --    --    --    --    CR   --   0.66  0.65  0.61  0.62  0.58   < >  0.68   < >  0.64  0.75   GFRESTIMATED   --   88  88  >90  Non  GFR Calc    >90  Non   "American GFR Calc    >90  Non  GFR Calc     < >  85   < >  >90  Non  GFR Calc    76   GFRESTBLACK   --   >90  >90  >90  African American GFR Calc    >90   GFR Calc    >90   GFR Calc     < >  >90   GFR Calc     < >  >90   GFR Calc    >90   GFR Calc     POTASSIUM   --   5.0  3.9  3.8  3.7  3.6   < >  3.8   < >  4.0  3.4   TSH   --    --    --    --    --    --    --    --    --    --   1.43    < > = values in this interval not displayed.      BP Readings from Last 3 Encounters:   08/20/18 132/76   08/08/18 118/82   02/09/18 130/80    Wt Readings from Last 3 Encounters:   08/20/18 166 lb (75.3 kg)   08/08/18 168 lb (76.2 kg)   02/09/18 181 lb 3.2 oz (82.2 kg)                  Labs reviewed in EPIC    Reviewed and updated as needed this visit by clinical staff  Tobacco  Allergies  Meds  Med Hx  Surg Hx  Fam Hx  Soc Hx      Reviewed and updated as needed this visit by Provider         ROS:  Constitutional, HEENT, cardiovascular, pulmonary, GI, , musculoskeletal, neuro, skin, endocrine and psych systems are negative, except as otherwise noted.    This document serves as a record of the services and decisions personally performed and made by Trae Medina D.O. It was created on her behalf by Kodak Rowell, a trained medical scribe. The creation of this document is based on the provider's statements to the medical scribe.  Kodak Rowell August 20, 2018 9:42 AM      OBJECTIVE:     /76  Pulse 86  Temp 97.9  F (36.6  C) (Oral)  Ht 5' 8\" (1.727 m)  Wt 166 lb (75.3 kg)  SpO2 99%  Breastfeeding? No  BMI 25.24 kg/m2  Body mass index is 25.24 kg/(m^2).  GENERAL: healthy, alert and no distress  EYES: Eyes grossly normal to inspection, PERRL and conjunctivae and sclerae normal  NECK: no adenopathy, no asymmetry, masses, or scars and thyroid normal to palpation  RESP: lungs clear to auscultation - no " "rales, rhonchi or wheezes  CV: regular rate and rhythm, normal S1 S2, no S3 or S4, no murmur, click or rub, no peripheral edema and peripheral pulses strong  ABDOMEN: soft, nontender, no hepatosplenomegaly, no masses and bowel sounds normal  MS: no gross musculoskeletal defects noted, no edema  NEURO: Normal strength and tone, mentation intact and speech normal  PSYCH: mentation appears normal, affect normal/bright    Diagnostic Test Results:  Results for orders placed or performed in visit on 08/20/18 (from the past 24 hour(s))   Hemoglobin A1c   Result Value Ref Range    Hemoglobin A1C 5.1 0 - 5.6 %     Results for orders placed or performed in visit on 08/20/18   Hemoglobin A1c   Result Value Ref Range    Hemoglobin A1C 5.1 0 - 5.6 %       ASSESSMENT/PLAN:     Tobacco Cessation:   reports that she has never smoked. She has never used smokeless tobacco.      BMI:   Estimated body mass index is 25.24 kg/(m^2) as calculated from the following:    Height as of this encounter: 5' 8\" (1.727 m).    Weight as of this encounter: 166 lb (75.3 kg).         1. Palpitations  She reports experiencing a couple very short lasting episodes that resolved so quickly she was not able to push the button on Zio patch. Palpitations were reported to have been resolved now, and she noted that yesterday was a \"perfect\" day.  Today no symptoms, has had normal labs recently and stress test was ok few years back. -she had ziopatch ordered, she is to send it back to be read and follow up with cards.  She has her surgery soon and wants to be cleared to have her second cataract surgery done  - CARDIOLOGY EVAL ADULT REFERRAL  - TSH with free T4 reflex  - Hemoglobin A1c    2. Blood glucose elevated  Hemoglobin A1c today. Glucose levels were elevated at 105 on 8/8/18 and she reported that she was fasting that day.   - Hemoglobin A1c    3. Visit for screening mammogram  - MA SCREENING DIGITAL BILAT - Future  (s+30); Future    4. Hypertension goal " BP (blood pressure) < 140/90  Stable today, she is nervous today as well. She will continue taking hydrochlorothiazide as directed. Labs today.   - CARDIOLOGY EVAL ADULT REFERRAL  - TSH with free T4 reflex  - Hemoglobin A1c    5. Screening for thyroid disorder  Recheck labs today.   - TSH with free T4 reflex    Patient instructions discussed with patient    The information in this document, created by the medical scribe for me, accurately reflects the services I personally performed and the decisions made by me. I have reviewed and approved this document for accuracy prior to leaving the patient care area.  August 20, 2018 10:02 AM      Trae Medina DO  [facility]

## 2018-08-21 LAB — TSH SERPL DL<=0.005 MIU/L-ACNC: 0.77 MU/L (ref 0.4–4)

## 2018-08-22 ENCOUNTER — TRANSFERRED RECORDS (OUTPATIENT)
Dept: HEALTH INFORMATION MANAGEMENT | Facility: CLINIC | Age: 73
End: 2018-08-22

## 2018-09-04 ENCOUNTER — OFFICE VISIT (OUTPATIENT)
Dept: CARDIOLOGY | Facility: CLINIC | Age: 73
End: 2018-09-04
Payer: MEDICARE

## 2018-09-04 VITALS
BODY MASS INDEX: 25.24 KG/M2 | HEART RATE: 76 BPM | DIASTOLIC BLOOD PRESSURE: 85 MMHG | WEIGHT: 166 LBS | SYSTOLIC BLOOD PRESSURE: 144 MMHG

## 2018-09-04 DIAGNOSIS — I47.10 PAROXYSMAL SUPRAVENTRICULAR TACHYCARDIA (H): Primary | ICD-10-CM

## 2018-09-04 DIAGNOSIS — I10 BENIGN ESSENTIAL HYPERTENSION: ICD-10-CM

## 2018-09-04 PROCEDURE — 99204 OFFICE O/P NEW MOD 45 MIN: CPT | Performed by: INTERNAL MEDICINE

## 2018-09-04 RX ORDER — METOPROLOL SUCCINATE 25 MG/1
25 TABLET, EXTENDED RELEASE ORAL DAILY
Qty: 90 TABLET | Refills: 3 | Status: SHIPPED | OUTPATIENT
Start: 2018-09-04 | End: 2018-09-06

## 2018-09-04 NOTE — MR AVS SNAPSHOT
After Visit Summary   9/4/2018    Patricia A Sowada    MRN: [id number]           Patient Information     Date Of Birth          1945        Visit Information        Provider Department      9/4/2018 10:00 AM Pamela Cotton MD [facility]        Today's Diagnoses     Paroxysmal supraventricular tachycardia (H)    -  1      Care Instructions    Thank you for coming to the [facility]; please note the following instructions:    1. Clinic will fax preop dictation to [facility] fax # [phone]  2.   metoprolol succinate (TOPROL-XL) 25 MG 24 hr tablet 90 tablet  9/4/2018  --   Sig - Route: Take 1 tablet (25 mg) by mouth daily - Oral       3. Dr. Pamela Cotton is recommending to see you on an as needed basis regarding your heart; please follow up with your primary care provider.  It was a pleasuring seeing you today at the [facility] @ the [facility].              If you have any questions regarding your visit please contact your care team:     Cardiology  Telephone Number   Meka HARMON, SUSHANT STEEN, SUSHANT HORN, YUMIKO STEWART MA   [phone]    *After hours: [phone]   For scheduling appts:     [phone] or    [phone] (select option 1)    *After hours: [phone]     For the Device Clinic (Pacemakers and ICD's)  RN's :  Carmelita De Paz   During business hours: [phone]    *After business hours:  [phone] (select option 4)      Normal test result notifications will be released via boosk or mailed within 7 business days.  All other test results, will be communicated via telephone once reviewed by your cardiologist.    If you need a medication refill please contact your pharmacy.  Please allow 3 business days for your refill to be completed.    As always, thank you for trusting us with your health care needs!            Follow-ups  after your visit        Who to contact     If you have questions or need follow up information about today's clinic visit or your schedule please contact [facility] directly at [phone].  Normal or non-critical lab and imaging results will be communicated to you by MyChart, letter or phone within 4 business days after the clinic has received the results. If you do not hear from us within 7 days, please contact the clinic through MyChart or phone. If you have a critical or abnormal lab result, we will notify you by phone as soon as possible.  Submit refill requests through [employer] or call your pharmacy and they will forward the refill request to us. Please allow 3 business days for your refill to be completed.          Additional Information About Your Visit        Care EveryWhere ID     This is your Care EveryWhere ID. This could be used by other organizations to access your [place] medical records  [id number]        Your Vitals Were     Pulse BMI (Body Mass Index)                76 25.24 kg/m2           Blood Pressure from Last 3 Encounters:   09/04/18 144/85   08/20/18 132/76   08/08/18 118/82    Weight from Last 3 Encounters:   09/04/18 75.3 kg (166 lb)   08/20/18 75.3 kg (166 lb)   08/08/18 76.2 kg (168 lb)              Today, you had the following     No orders found for display         Today's Medication Changes          These changes are accurate as of 9/4/18 10:47 AM.  If you have any questions, ask your nurse or doctor.               Start taking these medicines.        Dose/Directions    metoprolol succinate 25 MG 24 hr tablet   Commonly known as:  TOPROL-XL   Used for:  Paroxysmal supraventricular tachycardia (H)   Started by:  Pamela Cotton MD        Dose:  25 mg   Take 1 tablet (25 mg) by mouth daily   Quantity:  90 tablet   Refills:  3            Where to get your medicines      These medications [facility] [place] [address] [place] [facility] [place] [address]  [address]  [address], [place] [address]     Phone:  [phone]     metoprolol succinate 25 MG 24 hr tablet                Primary Care Provider Office Phone # Fax #    Trae Medina [phone]       [address] [facility] [address]        Equal Access to Services     [facility] : Hadii aad ku hadasho Soomaali, waaxda luqadaha, qaybta kaalmada adeegyada, waxay idiin hayaan adeeg kharash la'aan ah. So [facility] [phone].    ATENCIÓN: Si habla español, tiene a espinosa disposición servicios gratuitos de asistencia lingüística. Sayra al [phone].    We comply with applicable federal civil rights laws and [place] laws. We do not discriminate on the basis of race, color, national origin, age, disability, sex, sexual orientation, or gender identity.            Thank you!     Thank you for choosing [facility]  for your care. Our goal is always to provide you with excellent care. Hearing back from our patients is one way we can continue to improve our services. Please take a few minutes to complete the written survey that you may receive in the mail after your visit with us. Thank you!             Your Updated Medication List - Protect others around you: Learn how to safely use, store and throw away your medicines at www.disposemymeds.org.          This list is accurate as of 9/4/18 10:47 AM.  Always use your most recent med list.                   Brand Name Dispense Instructions for use Diagnosis    aspirin 81 MG EC tablet      Take 81 mg by mouth daily        hydrochlorothiazide 12.5 MG capsule    MICROZIDE    90 capsule    TAKE 1 CAPSULE BY MOUTH DAILY    Hypertension goal BP (blood pressure) < 140/90       latanoprost 0.005 % ophthalmic solution    XALATAN     Place 1 drop into both eyes At Bedtime        metoprolol succinate 25 MG 24 hr tablet    TOPROL-XL    90 tablet    Take 1 tablet (25 mg) by mouth daily    Paroxysmal  supraventricular tachycardia (H)

## 2018-09-04 NOTE — NURSING NOTE
"Chief Complaint   Patient presents with     Palpitations     NEW c/o tachycardia. Recent Zio showing episodes of SVT. No cardiac hx. Pt reports fluttering due to anxiety she states.       Initial /85 (BP Location: Left arm, Patient Position: Chair, Cuff Size: Adult Regular)  Pulse 76  Wt 75.3 kg (166 lb)  BMI 25.24 kg/m2 Estimated body mass index is 25.24 kg/(m^2) as calculated from the following:    Height as of 8/20/18: 1.727 m (5' 8\").    Weight as of this encounter: 75.3 kg (166 lb)..  BP completed using cuff size: regular    Mirian Saxena MA    "

## 2018-09-04 NOTE — PROGRESS NOTES
Referring provider:Adam Larkin MD    Chief complaint: Palpitations    HPI: Ms. Patricia A Sowada is a 73 year old  female with PMH significant for well controlled hypertension.    The patient is here at request of Dr. Medina regarding her SVT episodes captured on recent zio patch monitor.  The patient reports mild fluttering sensation while she was wearing the monitor for 3 days. She denies any associated symptoms like  dizziness, lightheadedness, chest discomfort, dyspnea or syncope.  The patient first noticed palpitations on the day of the cataract surgery on August 16. The procedure was was overall uneventful.     She is physically very active and denies any symptoms on exertion as well.  The patient does not have any prior cardiac history.  She is on hydrochlorothiazide 12.5 mg for blood pressure management.  She takes aspirin 81 mg which she holds currently for the expected second cataract surgery on 9/6. The patient is a non-smoker.  No history of diabetes or hyperlipidemia.    Zio patch event monitor on 8/16 showed regular SVT episodes lasting 6 min-42 min with heart rate range 137-240 bpm.  The rhythm is a regular SVT with narrow QRS with sudden onset and offset.    An exercise stress echocardiogram from 2017 showed normal baseline RV/LV function. The stress test was overall normal.  I have reviewed her ECG from 4/12/2017 which shows normal sinus rhythm and left anterior fascicular block.    Medications, personal, family, and social history reviewed with patient and revised.    PAST MEDICAL HISTORY:  Past Medical History:   Diagnosis Date     Bell's palsy 10/2001     COPD (chronic obstructive pulmonary disease) (H)      HTN (hypertension)      PONV (postoperative nausea and vomiting)        CURRENT MEDICATIONS:  Current Outpatient Prescriptions   Medication Sig Dispense Refill     aspirin 81 MG EC tablet Take 81 mg by mouth daily       hydrochlorothiazide (MICROZIDE) 12.5 MG capsule TAKE 1 CAPSULE BY  MOUTH DAILY 90 capsule 1     latanoprost (XALATAN) 0.005 % ophthalmic solution Place 1 drop into both eyes At Bedtime  0       PAST SURGICAL HISTORY:  Past Surgical History:   Procedure Laterality Date     BUNIONECTOMY RT/LT  6/2001    right     C NONSPECIFIC PROCEDURE  1996    hammer toe repair, bilat     COLONOSCOPY N/A 10/9/2015    Procedure: COLONOSCOPY;  Surgeon: Aldo Hurtado MD;  Location:  GI     TONSILLECTOMY  1950     TUBAL LIGATION  1980       ALLERGIES:     Allergies   Allergen Reactions     Penicillins Rash       FAMILY HISTORY:  Family History   Problem Relation Age of Onset     Cancer Father      lymphoma     HEART DISEASE Mother      valve replacement     Breast Cancer Sister      50 y/o     Hypertension Brother      Hypertension Brother      Diabetes No family hx of          SOCIAL HISTORY:  Social History   Substance Use Topics     Smoking status: Never Smoker     Smokeless tobacco: Never Used     Alcohol use No       ROS:   Constitutional: No fever, chills, or sweats. Weight stable.   ENT: No visual disturbance, ear ache, epistaxis, sore throat.   Cardiovascular: As per HPI.   Respiratory: No cough, hemoptysis.    GI: No nausea, vomiting, hematemesis, melena, or hematochezia.   : No hematuria.   Integument: Negative.   Psychiatric: Negative.   Hematologic:  No easy bruising, no easy bleeding.  Neuro: Negative.   Endocrinology: No significant heat or cold intolerance   Musculoskeletal: No myalgia.    Exam:  /85 (BP Location: Left arm, Patient Position: Chair, Cuff Size: Adult Regular)  Pulse 76  Wt 75.3 kg (166 lb)  BMI 25.24 kg/m2  GENERAL APPEARANCE: alert and no distress  HEENT: no icterus, no central cyanosis  LYMPH/NECK: no adenopathy, no asymmetry, JVP not elevated, no carotid bruits.  RESPIRATORY: lungs clear to auscultation - no rales, rhonchi or wheezes, no use of accessory muscles, no retractions, respirations are unlabored, normal respiratory  rate  CARDIOVASCULAR: regular rhythm, normal S1, S2, no S3 or S4 and no murmur, click or rub, precordium quiet with normal PMI.  GI: soft, non tender  EXTREMITIES: peripheral pulses normal, no edema  NEURO: alert, normal speech,and affect  VASC: Radial, dorsalis pedis and posterior tibialis pulses are normal in volumes and symmetric bilaterally.   SKIN: no ecchymoses, no rashes     I have reviewed the labs and personally reviewed the imaging below (zio patch an EKG from [facility]) and made my comment in the assessment and plan.    Labs:  CBC RESULTS:   Lab Results   Component Value Date    WBC 11.0 12/25/2016    RBC 4.23 12/25/2016    HGB 15.2 08/08/2018    HCT 38.7 12/25/2016    MCV 92 12/25/2016    MCH 29.3 12/25/2016    MCHC 32.0 12/25/2016    RDW 14.0 12/25/2016     12/25/2016       BMP RESULTS:  Lab Results   Component Value Date     08/08/2018    POTASSIUM 5.0 08/08/2018    CHLORIDE 102 08/08/2018    CO2 27 08/08/2018    ANIONGAP 9 08/08/2018     (H) 08/08/2018    BUN 11 08/08/2018    CR 0.66 08/08/2018    GFRESTIMATED 88 08/08/2018    GFRESTBLACK >90 08/08/2018    FELIPE 8.9 08/08/2018        Exercise stress echocardiogram 4/18/2017  Supine bicycle echo stress test  No angina symptoms with exercise.  Hypertensive blood pressure response to exercise (214/80 mmHg at peak  exercise)  No ECG evidence of ischemia.  No stress induced regional wall motion abnormalities.  Normal resting LV function with EF of approximately 60-65%; with exercise no  significant change in left ventricular cavity size and LVEF slightly lower at  55-60%. The reduced LV performance in this case could be as a result of  elevated LVEDP from the hypertensive response.  Average functional capacity for age.  Normal biventricular function, mildly dilated ascending aorta and no  significant valvular dysfunction noted on screening 2D and Doppler  examination.    Zio patch 3 days 8/16/2018 shows a regular SVT episodes heart rate  137-240 bpm.  Longest episode lasting for 42 minutes    EKG personally reviewed 4/12/2017: Sinus rhythm, normal IN, QRS and QTc durations.  Left anterior fascicular fascicular block noted.      Assessment and Plan:   Ms. Patricia A Sowada is a 73 year old  female with PMH significant for hypertension.    1.  Palpitations with SVT detected on zio patch: The patient reports very mild palpitation symptoms but otherwise hemodynamically stable during episodes with no lightheaded or dizzy feeling.  No syncope.  I recommended starting metoprolol 25 mg daily. Recent labs show normal CBC, bmp and TSH.    2.  Hypertension: Well controlled with hydrochlorothiazide 12.5 mg.    The patient is cleared to undergo cataract surgery on 9/6.    I recommended her to call us if she experiences any side effects with metoprolol or more frequent palpitations despite metoprolol. Otherwise she will follow-up with Dr. Medina.    A total of 30 minutes spent face-to-face with greater than 50% of the time spent in counseling and coordinating cares of the issues above.     Please donot hesitate to contact me if you have any questions or concerns. Again, thank you for allowing me to participate in the care of your patient.    Pamela VANCE MD  [facility] Division of Cardiology  Pager [phone]

## 2018-09-04 NOTE — PATIENT INSTRUCTIONS
Thank you for coming to the [facility]; please note the following instructions:    1. Clinic will fax preop dictation to [facility] fax # [phone]  2.   metoprolol succinate (TOPROL-XL) 25 MG 24 hr tablet 90 tablet  9/4/2018  --   Sig - Route: Take 1 tablet (25 mg) by mouth daily - Oral       3. Dr. Pamela Cotton is recommending to see you on an as needed basis regarding your heart; please follow up with your primary care provider.  It was a pleasuring seeing you today at the [facility] @ the [facility].              If you have any questions regarding your visit please contact your care team:     Cardiology  Telephone Number   Meka HARMON, SUSHANT STEEN, SUSHANT HORN, YUMIKO STEWART, MA   [phone]    *After hours: [phone]   For scheduling appts:     [phone] or    [phone] (select option 1)    *After hours: [phone]     For the Device Clinic (Pacemakers and ICD's)  RN's :  Carmelita De Paz   During business hours: [phone]    *After business hours:  [phone] (select option 4)      Normal test result notifications will be released via [email or web address] or mailed within 7 business days.  All other test results, will be communicated via telephone once reviewed by your cardiologist.    If you need a medication refill please contact your pharmacy.  Please allow 3 business days for your refill to be completed.    As always, thank you for trusting us with your health care needs!

## 2018-09-04 NOTE — LETTER
9/4/2018    RE: Patricia A Sowada  [address] [address]       Dear Colleague,    Thank you for the opportunity to participate in the care of your patient, Patricia A Sowada, at the [facility] HEART AT [facility]. Please see a copy of my visit note below.    Referring provider:Adam Larkin MD    Chief complaint: Palpitations    HPI: Ms. Patricia A Sowada is a 73 year old  female with PMH significant for well controlled hypertension.    The patient is here at request of Dr. Medina regarding her SVT episodes captured on recent zio patch monitor.  The patient reports mild fluttering sensation while she was wearing the monitor for 3 days. She denies any associated symptoms like  dizziness, lightheadedness, chest discomfort, dyspnea or syncope.  The patient first noticed palpitations on the day of the cataract surgery on August 16. The procedure was was overall uneventful.     She is physically very active and denies any symptoms on exertion as well.  The patient does not have any prior cardiac history.  She is on hydrochlorothiazide 12.5 mg for blood pressure management.  She takes aspirin 81 mg which she holds currently for the expected second cataract surgery on 9/6. The patient is a non-smoker.  No history of diabetes or hyperlipidemia.    Zio patch event monitor on 8/16 showed regular SVT episodes lasting 6 min-42 min with heart rate range 137-240 bpm.  The rhythm is a regular SVT with narrow QRS with sudden onset and offset.    An exercise stress echocardiogram from 2017 showed normal baseline RV/LV function. The stress test was overall normal.  I have reviewed her ECG from 4/12/2017 which shows normal sinus rhythm and left anterior fascicular block.    Medications, personal, family, and social history reviewed with patient and revised.    PAST MEDICAL HISTORY:  Past Medical History:   Diagnosis Date      Bell's palsy 10/2001     COPD (chronic obstructive pulmonary disease) (H)      HTN (hypertension)      PONV (postoperative nausea and vomiting)        CURRENT MEDICATIONS:  Current Outpatient Prescriptions   Medication Sig Dispense Refill     aspirin 81 MG EC tablet Take 81 mg by mouth daily       hydrochlorothiazide (MICROZIDE) 12.5 MG capsule TAKE 1 CAPSULE BY MOUTH DAILY 90 capsule 1     latanoprost (XALATAN) 0.005 % ophthalmic solution Place 1 drop into both eyes At Bedtime  0       PAST SURGICAL HISTORY:  Past Surgical History:   Procedure Laterality Date     BUNIONECTOMY RT/LT  6/2001    right     C NONSPECIFIC PROCEDURE  1996    hammer toe repair, bilat     COLONOSCOPY N/A 10/9/2015    Procedure: COLONOSCOPY;  Surgeon: Aldo Hurtado MD;  Location:  GI     TONSILLECTOMY  1950     TUBAL LIGATION  1980       ALLERGIES:     Allergies   Allergen Reactions     Penicillins Rash       FAMILY HISTORY:  Family History   Problem Relation Age of Onset     Cancer Father      lymphoma     HEART DISEASE Mother      valve replacement     Breast Cancer Sister      52 y/o     Hypertension Brother      Hypertension Brother      Diabetes No family hx of          SOCIAL HISTORY:  Social History   Substance Use Topics     Smoking status: Never Smoker     Smokeless tobacco: Never Used     Alcohol use No       ROS:   Constitutional: No fever, chills, or sweats. Weight stable.   ENT: No visual disturbance, ear ache, epistaxis, sore throat.   Cardiovascular: As per HPI.   Respiratory: No cough, hemoptysis.    GI: No nausea, vomiting, hematemesis, melena, or hematochezia.   : No hematuria.   Integument: Negative.   Psychiatric: Negative.   Hematologic:  No easy bruising, no easy bleeding.  Neuro: Negative.   Endocrinology: No significant heat or cold intolerance   Musculoskeletal: No myalgia.    Exam:  /85 (BP Location: Left arm, Patient Position: Chair, Cuff Size: Adult Regular)  Pulse 76  Wt 75.3 kg (166  lb)  BMI 25.24 kg/m2  GENERAL APPEARANCE: alert and no distress  HEENT: no icterus, no central cyanosis  LYMPH/NECK: no adenopathy, no asymmetry, JVP not elevated, no carotid bruits.  RESPIRATORY: lungs clear to auscultation - no rales, rhonchi or wheezes, no use of accessory muscles, no retractions, respirations are unlabored, normal respiratory rate  CARDIOVASCULAR: regular rhythm, normal S1, S2, no S3 or S4 and no murmur, click or rub, precordium quiet with normal PMI.  GI: soft, non tender  EXTREMITIES: peripheral pulses normal, no edema  NEURO: alert, normal speech,and affect  VASC: Radial, dorsalis pedis and posterior tibialis pulses are normal in volumes and symmetric bilaterally.   SKIN: no ecchymoses, no rashes     I have reviewed the labs and personally reviewed the imaging below (zio patch an EKG from [employer]) and made my comment in the assessment and plan.    Labs:  CBC RESULTS:   Lab Results   Component Value Date    WBC 11.0 12/25/2016    RBC 4.23 12/25/2016    HGB 15.2 08/08/2018    HCT 38.7 12/25/2016    MCV 92 12/25/2016    MCH 29.3 12/25/2016    MCHC 32.0 12/25/2016    RDW 14.0 12/25/2016     12/25/2016       BMP RESULTS:  Lab Results   Component Value Date     08/08/2018    POTASSIUM 5.0 08/08/2018    CHLORIDE 102 08/08/2018    CO2 27 08/08/2018    ANIONGAP 9 08/08/2018     (H) 08/08/2018    BUN 11 08/08/2018    CR 0.66 08/08/2018    GFRESTIMATED 88 08/08/2018    GFRESTBLACK >90 08/08/2018    FELIPE 8.9 08/08/2018        Exercise stress echocardiogram 4/18/2017  Supine bicycle echo stress test  No angina symptoms with exercise.  Hypertensive blood pressure response to exercise (214/80 mmHg at peak  exercise)  No ECG evidence of ischemia.  No stress induced regional wall motion abnormalities.  Normal resting LV function with EF of approximately 60-65%; with exercise no  significant change in left ventricular cavity size and LVEF slightly lower at  55-60%. The reduced LV performance  in this case could be as a result of  elevated LVEDP from the hypertensive response.  Average functional capacity for age.  Normal biventricular function, mildly dilated ascending aorta and no  significant valvular dysfunction noted on screening 2D and Doppler  examination.    Zio patch 3 days 8/16/2018 shows a regular SVT episodes heart rate 137-240 bpm.  Longest episode lasting for 42 minutes    EKG personally reviewed 4/12/2017: Sinus rhythm, normal MN, QRS and QTc durations.  Left anterior fascicular fascicular block noted.      Assessment and Plan:   Ms. Patricia A Sowada is a 73 year old  female with PMH significant for hypertension.    1.  Palpitations with SVT detected on zio patch: The patient reports very mild palpitation symptoms but otherwise hemodynamically stable during episodes with no lightheaded or dizzy feeling.  No syncope.  I recommended starting metoprolol 25 mg daily. Recent labs show normal CBC, bmp and TSH.    2.  Hypertension: Well controlled with hydrochlorothiazide 12.5 mg.    The patient is cleared to undergo cataract surgery on 9/6.    I recommended her to call us if she experiences any side effects with metoprolol or more frequent palpitations despite metoprolol. Otherwise she will follow-up with Dr. Medina.    A total of 30 minutes spent face-to-face with greater than 50% of the time spent in counseling and coordinating cares of the issues above.     Please donot hesitate to contact me if you have any questions or concerns. Again, thank you for allowing me to participate in the care of your patient.    Pamela VANCE MD  [facility] Division of Cardiology  Pager [phone]

## 2018-09-06 ENCOUNTER — TELEPHONE (OUTPATIENT)
Dept: CARDIOLOGY | Facility: CLINIC | Age: 73
End: 2018-09-06

## 2018-09-06 DIAGNOSIS — I47.10 PAROXYSMAL SUPRAVENTRICULAR TACHYCARDIA (H): ICD-10-CM

## 2018-09-06 RX ORDER — METOPROLOL SUCCINATE 25 MG/1
50 TABLET, EXTENDED RELEASE ORAL DAILY
Qty: 90 TABLET | Refills: 3
Start: 2018-09-06 | End: 2018-10-10

## 2018-09-06 NOTE — TELEPHONE ENCOUNTER
Date: 9/6/2018    Time of Call: 4:53 PM     Diagnosis:  SVT found today while getting ready for cataract surgery and it was cancelled.     [ VORB ] Ordering provider: Dr. Cotton  Order: increase metoprolol to 50 mg daily.  Then complete a 2 week zio and follow up appt for results     Order received by: Meka Miguel RN       Follow-up/additional notes: Patient informed and verbalized understanding.  Patient states that she is feeling well and does not particularly feel the fast beats.  Does state she woke up feeling hot in the middle of the night and was wondering if this could be caused by metoprolol.  I explained that I did not know if that is related to the metoprolol but to continue to monitor symptoms and call clinic if present/worsen.  Patient verbalized understanding.  All appts made with patient.  Med list updated.    Meka Miguel RN            OR Anesthesia - Rhonda Sotelo MD - 09/06/2018 1:14 PM CDT  Anesthesia Staff  Patient scheduled for cataract surgery. She is noted in preop to be Intermittently in a rapid SVT to rate 160. BP okay and patient is asymptomatic except she is aware that her heart is racing. She admits to feeling anxious about surgery.   Per report she was tachycardic with SVT during prior cataract surgery and was referred to cardiology postop.   She has been started on metoprolol but clearly is not yet adequately rate controlled. Thus I do not feel patient is optimized to safely proceed with an elective cataract surgery.  Rhythm strips and ECG obtained. Cardiology office contacted. Discussed at length with patient and her daughter who understand and accept my concerns.  Patient needs to have her SVT rate under better control for an elective anesthetic. Discussed with Dr. Chopra.

## 2018-09-12 ENCOUNTER — ALLIED HEALTH/NURSE VISIT (OUTPATIENT)
Dept: CARDIOLOGY | Facility: CLINIC | Age: 73
End: 2018-09-12
Payer: MEDICARE

## 2018-09-12 DIAGNOSIS — I47.10 PAROXYSMAL SUPRAVENTRICULAR TACHYCARDIA (H): ICD-10-CM

## 2018-09-12 PROCEDURE — 0296T ZIO PATCH HOLTER: CPT | Performed by: INTERNAL MEDICINE

## 2018-09-12 PROCEDURE — 0298T ZZC EXT ECG > 48HR TO 21 DAY REVIEW AND INTERPRETATN: CPT | Performed by: INTERNAL MEDICINE

## 2018-09-12 NOTE — PATIENT INSTRUCTIONS
1.  We have applied a holter (heart) monitor for you to wear for 14 days.  You may remove the heart monitor on Wednesday, September 26th at 1:30 pm.  Please see the enclosed instructions for further information, as well as instructions for removal of the heart monitor and return mailing directions.  If you should have questions regarding your monitor, please call [employer] at [phone].  The Cardiology Nurse will contact you with your results (please see result notification details at bottom of summary).    *PLEASE DO NOT SHOWER OR INDUCE EXCESSIVE SWEATING IN THE FIRST 24 HOURS OF WEARING*          [facility] - [place] Location    If you have any questions regarding your visit please contact your care team:     Cardiology  Telephone Number   Meka Miguel,   Cardiology RN   Deb Cornell MA [phone]    After hours: [phone]   For scheduling appts:     [phone] or  [phone]    After hours: [phone]   For the Device Clinic (Pacemakers and ICD's)  RN's :  Carmelita De Paz   During business hours: [phone]  After business hours:  [phone]- select option 4 and ask for job code [id number].     Normal test result notifications will be released via [employer] or mailed within 7 business days.  All other test results, will be communicated via telephone once reviewed by your cardiologist.    If you need a medication refill please contact your pharmacy.  Please allow 3 business days for your refill to be completed.    As always, thank you for trusting us with your health care needs!

## 2018-09-12 NOTE — NURSING NOTE
2 week ZioXT applied to patient today. Instructions on use and removal given and mail back instructions discussed. All questions answered. Consent form signed. Device registered. Form faxed to [email or web address].  Device number: [id number].    Mirian Saxena MA

## 2018-09-12 NOTE — MR AVS SNAPSHOT
After Visit Summary   9/12/2018    Patricia A Sowada    MRN: [id number]           Patient Information     Date Of Birth          1945        Visit Information        Provider Department      9/12/2018 1:30 PM [facility]YS[facility]        Care Instructions    1.  We have applied a holter (heart) monitor for you to wear for 14 days.  You may remove the heart monitor on Wednesday, September 26th at 1:30 pm.  Please see the enclosed instructions for further information, as well as instructions for removal of the heart monitor and return mailing directions.  If you should have questions regarding your monitor, please call [employer] at [phone].  The Cardiology Nurse will contact you with your results (please see result notification details at bottom of summary).    *PLEASE DO NOT SHOWER OR INDUCE EXCESSIVE SWEATING IN THE FIRST 24 HOURS OF WEARING*          [facility] Cardiology - [place] Location    If you have any questions regarding your visit please contact your care team:     Cardiology  Telephone Number   Meka Miguel,   Cardiology RN   Deb Cornell MA [phone]    After hours: [phone]   For scheduling appts:     [phone] or  [phone]    After hours: [phone]   For the Device Clinic (Pacemakers and ICD's)  RN's :  Carmelita De Paz   During business hours: [phone]  After business hours:  [phone]- select option 4 and ask for job code [id number].     Normal test result notifications will be released via [employer] or mailed within 7 business days.  All other test results, will be communicated via telephone once reviewed by your cardiologist.    If you need a medication refill please contact your pharmacy.  Please allow 3 business days for your refill to be completed.    As always, thank you for trusting us with your health care needs!                Follow-ups after your  visit        Your next 10 appointments already scheduled     Oct 24, 2018 10:00 AM CDT   Return Visit with Pamela Cotton MD   [facility] PHYSICIANS HEART AT [facility] ([facility])    [address] [place] [address] 2nd Floor  [facility] [address] [phone]              Who to contact     If you have questions or need follow up information about today's clinic visit or your schedule please contact [facility] PHYSICIANS HEART AT [facility] directly at [phone].  Normal or non-critical lab and imaging results will be communicated to you by MyChart, letter or phone within 4 business days after the clinic has received the results. If you do not hear from us within 7 days, please contact the clinic through MyChart or phone. If you have a critical or abnormal lab result, we will notify you by phone as soon as possible.  Submit refill requests through [employer] or call your pharmacy and they will forward the refill request to us. Please allow 3 business days for your refill to be completed.          Additional Information About Your Visit        Care EveryWhere ID     This is your Care EveryWhere ID. This could be used by other organizations to access your [place] medical records  [id number]         Blood Pressure from Last 3 Encounters:   09/04/18 144/85   08/20/18 132/76   08/08/18 118/82    Weight from Last 3 Encounters:   09/04/18 75.3 kg (166 lb)   08/20/18 75.3 kg (166 lb)   08/08/18 76.2 kg (168 lb)              Today, you had the following     No orders found for display       Primary Care Provider Office Phone # Fax #    Trae Gt Medina [phone] [address] [facility] [address]        Equal Access to Services     LIZETT JOSEPH : Hadii marley [place], waaxda luqadaha, qaybta kaalmada mini whyte. So [facility] [phone].    ATENCIÓN: Si habla español, tiene a espinosa disposición servicios  catalino de asistencia lingüística. Sayra ridley [phone].    We comply with applicable federal civil rights laws and [place] laws. We do not discriminate on the basis of race, color, national origin, age, disability, sex, sexual orientation, or gender identity.            Thank you!     Thank you for choosing [facility] PHYSICIANS HEART AT [facility]  for your care. Our goal is always to provide you with excellent care. Hearing back from our patients is one way we can continue to improve our services. Please take a few minutes to complete the written survey that you may receive in the mail after your visit with us. Thank you!             Your Updated Medication List - Protect others around you: Learn how to safely use, store and throw away your medicines at www.disposemymeds.org.          This list is accurate as of 9/12/18  1:56 PM.  Always use your most recent med list.                   Brand Name Dispense Instructions for use Diagnosis    aspirin 81 MG EC tablet      Take 81 mg by mouth daily        hydrochlorothiazide 12.5 MG capsule    MICROZIDE    90 capsule    TAKE 1 CAPSULE BY MOUTH DAILY    Hypertension goal BP (blood pressure) < 140/90       latanoprost 0.005 % ophthalmic solution    XALATAN     Place 1 drop into both eyes At Bedtime        metoprolol succinate 25 MG 24 hr tablet    TOPROL-XL    90 tablet    Take 2 tablets (50 mg) by mouth daily    Paroxysmal supraventricular tachycardia (H)

## 2018-10-10 ENCOUNTER — OFFICE VISIT (OUTPATIENT)
Dept: CARDIOLOGY | Facility: CLINIC | Age: 73
End: 2018-10-10
Payer: MEDICARE

## 2018-10-10 VITALS
WEIGHT: 171.2 LBS | SYSTOLIC BLOOD PRESSURE: 137 MMHG | HEART RATE: 70 BPM | DIASTOLIC BLOOD PRESSURE: 78 MMHG | HEIGHT: 67 IN | OXYGEN SATURATION: 99 % | BODY MASS INDEX: 26.87 KG/M2

## 2018-10-10 DIAGNOSIS — Z01.810 PRE-OPERATIVE CARDIOVASCULAR EXAMINATION: ICD-10-CM

## 2018-10-10 DIAGNOSIS — I10 BENIGN ESSENTIAL HYPERTENSION: Primary | ICD-10-CM

## 2018-10-10 DIAGNOSIS — I47.10 PAROXYSMAL SUPRAVENTRICULAR TACHYCARDIA (H): ICD-10-CM

## 2018-10-10 PROCEDURE — 99214 OFFICE O/P EST MOD 30 MIN: CPT | Performed by: INTERNAL MEDICINE

## 2018-10-10 RX ORDER — METOPROLOL SUCCINATE 50 MG/1
50 TABLET, EXTENDED RELEASE ORAL DAILY
Qty: 90 TABLET | Refills: 3 | Status: SHIPPED | OUTPATIENT
Start: 2018-10-10 | End: 2019-01-16

## 2018-10-10 RX ORDER — METOPROLOL SUCCINATE 25 MG/1
25 TABLET, EXTENDED RELEASE ORAL DAILY
Qty: 90 TABLET | Refills: 3 | COMMUNITY
Start: 2018-10-10 | End: 2018-10-23

## 2018-10-10 ASSESSMENT — PAIN SCALES - GENERAL: PAINLEVEL: NO PAIN (0)

## 2018-10-10 NOTE — LETTER
10/10/2018    RE: Patricia A Sowada  [address] [id number]       Dear Colleague,    Thank you for the opportunity to participate in the care of your patient, Patricia A Sowada, at the [facility]. Please see a copy of my visit note below.    Referring provider:Adam Larkin MD    Chief complaint: Palpitations    HPI: Ms. Patricia A Sowada is a 73 year old  female with PMH significant for well controlled hypertension.    The patient is here at request of Dr. Medina regarding her SVT episodes captured on recent zio patch monitor.  The patient reports mild fluttering sensation while she was wearing the monitor for 3 days. She denies any associated symptoms like  dizziness, lightheadedness, chest discomfort, dyspnea or syncope.  The patient first noticed palpitations on the day of the cataract surgery on August 16. The procedure was overall uneventful.     She is physically very active and denies any symptoms on exertion as well.  The patient does not have any prior cardiac history.  She is on hydrochlorothiazide 12.5 mg for blood pressure management.  She takes aspirin 81 mg which she holds currently for the expected second cataract surgery on 9/6. The patient is a non-smoker.  No history of diabetes or hyperlipidemia.    Zio patch event monitor on 8/16 showed regular SVT episodes lasting 6 min-42 min with heart rate range 137-240 bpm.  The rhythm is a regular SVT with narrow QRS with sudden onset and offset.    An exercise stress echocardiogram from 2017 showed normal baseline RV/LV function. The stress test was overall normal.  I have reviewed her ECG from 4/12/2017 which shows normal sinus rhythm and left anterior fascicular block.    Medications, personal, family, and social history reviewed with patient and revised.    Interval history 10/10/2018:  The patient returns for follow-up. Few  weeks ago on the day of the planned cataract surgery the patient was noted to be in SVT prior to the onset of anesthesia. The patient denies any symptom at that time. She was asked to cough which terminated the tachycardia. Though she remained in SR afterwards the anesthesia team cancelled the procedure and recommended to see cardiology again.  After this episode I increased her metoprolol dose to 50 mg and she underwent a Zio-patch monitoring for 2 weeks which showed evidence of SVT longest episode lasting 30 minutes. She reports minimal symptoms during monitoring period. She continues to do well from cardiac standpoint. Her symptoms of fluttering improved after starting metoprolol. She still reports short lasting palpitation symptoms. Otherwise no other complaints.    PAST MEDICAL HISTORY:  Past Medical History:   Diagnosis Date     Bell's palsy 10/2001     COPD (chronic obstructive pulmonary disease) (H)      HTN (hypertension)      PONV (postoperative nausea and vomiting)        CURRENT MEDICATIONS:  Current Outpatient Prescriptions   Medication Sig Dispense Refill     aspirin 81 MG EC tablet Take 81 mg by mouth daily       hydrochlorothiazide (MICROZIDE) 12.5 MG capsule TAKE 1 CAPSULE BY MOUTH DAILY 90 capsule 1     latanoprost (XALATAN) 0.005 % ophthalmic solution Place 1 drop into both eyes At Bedtime  0     metoprolol succinate (TOPROL-XL) 25 MG 24 hr tablet Take 2 tablets (50 mg) by mouth daily 90 tablet 3       PAST SURGICAL HISTORY:  Past Surgical History:   Procedure Laterality Date     BUNIONECTOMY RT/LT  6/2001    right     C NONSPECIFIC PROCEDURE  1996    hammer toe repair, bilat     COLONOSCOPY N/A 10/9/2015    Procedure: COLONOSCOPY;  Surgeon: Aldo Hurtado MD;  Location:  GI     TONSILLECTOMY  1950     TUBAL LIGATION  1980       ALLERGIES:     Allergies   Allergen Reactions     Penicillins Rash       FAMILY HISTORY:  Family History   Problem Relation Age of Onset     Cancer Father  "     lymphoma     HEART DISEASE Mother      valve replacement     Breast Cancer Sister      50 y/o     Hypertension Brother      Hypertension Brother      Diabetes No family hx of          SOCIAL HISTORY:  Social History   Substance Use Topics     Smoking status: Never Smoker     Smokeless tobacco: Never Used     Alcohol use No       ROS:   Constitutional: No fever, chills, or sweats. Weight stable.   Cardiovascular: As per HPI.   Respiratory: No cough, hemoptysis.    GI: No nausea, vomiting, hematemesis, melena, or hematochezia.   : No hematuria.   Hematologic:  No easy bruising, no easy bleeding.    Exam:  /78 (BP Location: Left arm, Patient Position: Chair, Cuff Size: Adult Regular)  Pulse 70  Ht 1.702 m (5' 7\")  Wt 77.7 kg (171 lb 3.2 oz)  SpO2 99%  BMI 26.81 kg/m2  GENERAL APPEARANCE: alert and no distress  HEENT: no icterus, no central cyanosis  LYMPH/NECK:JVP not elevated  RESPIRATORY: lungs clear to auscultation - no rales, rhonchi or wheezes, no use of accessory muscles, no retractions, respirations are unlabored, normal respiratory rate  CARDIOVASCULAR: regular rhythm, normal S1, S2, no S3 or S4 and no murmur, click or rub, precordium quiet with normal PMI.  GI: soft, non tender  EXTREMITIES: no edema  NEURO: alert, normal speech,and affect  SKIN: no ecchymoses, no rashes     I have reviewed the labs and personally reviewed the imaging below (celesteo patch ) and made my comment in the assessment and plan.    Labs:  CBC RESULTS:   Lab Results   Component Value Date    WBC 11.0 12/25/2016    RBC 4.23 12/25/2016    HGB 15.2 08/08/2018    HCT 38.7 12/25/2016    MCV 92 12/25/2016    MCH 29.3 12/25/2016    MCHC 32.0 12/25/2016    RDW 14.0 12/25/2016     12/25/2016       BMP RESULTS:  Lab Results   Component Value Date     08/08/2018    POTASSIUM 5.0 08/08/2018    CHLORIDE 102 08/08/2018    CO2 27 08/08/2018    ANIONGAP 9 08/08/2018     (H) 08/08/2018    BUN 11 08/08/2018    CR 0.66 " 08/08/2018    GFRESTIMATED 88 08/08/2018    GFRESTBLACK >90 08/08/2018    FELIPE 8.9 08/08/2018        Exercise stress echocardiogram 4/18/2017  Supine bicycle echo stress test  No angina symptoms with exercise.  Hypertensive blood pressure response to exercise (214/80 mmHg at peak  exercise)  No ECG evidence of ischemia.  No stress induced regional wall motion abnormalities.  Normal resting LV function with EF of approximately 60-65%; with exercise no  significant change in left ventricular cavity size and LVEF slightly lower at  55-60%. The reduced LV performance in this case could be as a result of  elevated LVEDP from the hypertensive response.  Average functional capacity for age.  Normal biventricular function, mildly dilated ascending aorta and no  significant valvular dysfunction noted on screening 2D and Doppler  examination.    Zio-patch , 9/12/2018, 2weeks: personally reviewed,  SVT regular, longest for 30 min, HR ~150 bpm.    Zio patch 3 days 8/16/2018 shows a regular SVT episodes heart rate 137-240 bpm.  Longest episode lasting for 42 minutes    EKG personally reviewed 4/12/2017: Sinus rhythm, normal MN, QRS and QTc durations.  Left anterior fascicular fascicular block noted.      Assessment and Plan:   Ms. Patricia A Sowada is a 73 year old  female with PMH significant for hypertension.    1.  Palpitations with SVT re-detected on recent zio patch: The patient had a sustained episode on the day of the cataract surgery on 9/26. The surgery was cancelled. The SVT episode terminated with cough suggesting likely AVNRT/AVRT as mechanism.  The patient still reports mild palpitation symptoms. She has been hemodynamically stable during episodes with no lightheaded or dizzy feeling.  No syncope. She is currently on metoprolol 50 mg daily. I recommended to increase the dose to 75 mg daily.  I did discuss with her the option of RFA. I will further discuss this during our next clinic visit in 3 months.    2.   Hypertension: Well controlled with hydrochlorothiazide 12.5 mg.    Discontinued ASA for primary prevention due to lack of benefit on recent large scale study.    The patient is cleared to undergo cataract surgery.    RTC 3 months.    A total of 30 minutes spent face-to-face with greater than 50% of the time spent in counseling and coordinating cares of the issues above.     Please donot hesitate to contact me if you have any questions or concerns. Again, thank you for allowing me to participate in the care of your patient.    Pamela VANCE MD  [facility] Division of Cardiology  Pager [phone]    Ml  Ravi Chopra MD  Opthalmology

## 2018-10-10 NOTE — PROGRESS NOTES
Referring provider:Adam Larkin MD    Chief complaint: Palpitations    HPI: Ms. Patricia A Sowada is a 73 year old  female with PMH significant for well controlled hypertension.    The patient is here at request of Dr. Medina regarding her SVT episodes captured on recent zio patch monitor.  The patient reports mild fluttering sensation while she was wearing the monitor for 3 days. She denies any associated symptoms like  dizziness, lightheadedness, chest discomfort, dyspnea or syncope.  The patient first noticed palpitations on the day of the cataract surgery on August 16. The procedure was overall uneventful.     She is physically very active and denies any symptoms on exertion as well.  The patient does not have any prior cardiac history.  She is on hydrochlorothiazide 12.5 mg for blood pressure management.  She takes aspirin 81 mg which she holds currently for the expected second cataract surgery on 9/6. The patient is a non-smoker.  No history of diabetes or hyperlipidemia.    Zio patch event monitor on 8/16 showed regular SVT episodes lasting 6 min-42 min with heart rate range 137-240 bpm.  The rhythm is a regular SVT with narrow QRS with sudden onset and offset.    An exercise stress echocardiogram from 2017 showed normal baseline RV/LV function. The stress test was overall normal.  I have reviewed her ECG from 4/12/2017 which shows normal sinus rhythm and left anterior fascicular block.    Medications, personal, family, and social history reviewed with patient and revised.    Interval history 10/10/2018:  The patient returns for follow-up. Few weeks ago on the day of the planned cataract surgery the patient was noted to be in SVT prior to the onset of anesthesia. The patient denies any symptom at that time. She was asked to cough which terminated the tachycardia. Though she remained in SR afterwards the anesthesia team cancelled the procedure and recommended to see cardiology again.  After this episode I  increased her metoprolol dose to 50 mg and she underwent a Zio-patch monitoring for 2 weeks which showed evidence of SVT longest episode lasting 30 minutes. She reports minimal symptoms during monitoring period. She continues to do well from cardiac standpoint. Her symptoms of fluttering improved after starting metoprolol. She still reports short lasting palpitation symptoms. Otherwise no other complaints.    PAST MEDICAL HISTORY:  Past Medical History:   Diagnosis Date     Bell's palsy 10/2001     COPD (chronic obstructive pulmonary disease) (H)      HTN (hypertension)      PONV (postoperative nausea and vomiting)        CURRENT MEDICATIONS:  Current Outpatient Prescriptions   Medication Sig Dispense Refill     aspirin 81 MG EC tablet Take 81 mg by mouth daily       hydrochlorothiazide (MICROZIDE) 12.5 MG capsule TAKE 1 CAPSULE BY MOUTH DAILY 90 capsule 1     latanoprost (XALATAN) 0.005 % ophthalmic solution Place 1 drop into both eyes At Bedtime  0     metoprolol succinate (TOPROL-XL) 25 MG 24 hr tablet Take 2 tablets (50 mg) by mouth daily 90 tablet 3       PAST SURGICAL HISTORY:  Past Surgical History:   Procedure Laterality Date     BUNIONECTOMY RT/LT  6/2001    right     C NONSPECIFIC PROCEDURE  1996    hammer toe repair, bilat     COLONOSCOPY N/A 10/9/2015    Procedure: COLONOSCOPY;  Surgeon: Aldo Hurtado MD;  Location:  GI     TONSILLECTOMY  1950     TUBAL LIGATION  1980       ALLERGIES:     Allergies   Allergen Reactions     Penicillins Rash       FAMILY HISTORY:  Family History   Problem Relation Age of Onset     Cancer Father      lymphoma     HEART DISEASE Mother      valve replacement     Breast Cancer Sister      50 y/o     Hypertension Brother      Hypertension Brother      Diabetes No family hx of          SOCIAL HISTORY:  Social History   Substance Use Topics     Smoking status: Never Smoker     Smokeless tobacco: Never Used     Alcohol use No       ROS:   Constitutional: No  "fever, chills, or sweats. Weight stable.   Cardiovascular: As per HPI.   Respiratory: No cough, hemoptysis.    GI: No nausea, vomiting, hematemesis, melena, or hematochezia.   : No hematuria.   Hematologic:  No easy bruising, no easy bleeding.    Exam:  /78 (BP Location: Left arm, Patient Position: Chair, Cuff Size: Adult Regular)  Pulse 70  Ht 1.702 m (5' 7\")  Wt 77.7 kg (171 lb 3.2 oz)  SpO2 99%  BMI 26.81 kg/m2  GENERAL APPEARANCE: alert and no distress  HEENT: no icterus, no central cyanosis  LYMPH/NECK:JVP not elevated  RESPIRATORY: lungs clear to auscultation - no rales, rhonchi or wheezes, no use of accessory muscles, no retractions, respirations are unlabored, normal respiratory rate  CARDIOVASCULAR: regular rhythm, normal S1, S2, no S3 or S4 and no murmur, click or rub, precordium quiet with normal PMI.  GI: soft, non tender  EXTREMITIES: no edema  NEURO: alert, normal speech,and affect  SKIN: no ecchymoses, no rashes     I have reviewed the labs and personally reviewed the imaging below (celesteo patch ) and made my comment in the assessment and plan.    Labs:  CBC RESULTS:   Lab Results   Component Value Date    WBC 11.0 12/25/2016    RBC 4.23 12/25/2016    HGB 15.2 08/08/2018    HCT 38.7 12/25/2016    MCV 92 12/25/2016    MCH 29.3 12/25/2016    MCHC 32.0 12/25/2016    RDW 14.0 12/25/2016     12/25/2016       BMP RESULTS:  Lab Results   Component Value Date     08/08/2018    POTASSIUM 5.0 08/08/2018    CHLORIDE 102 08/08/2018    CO2 27 08/08/2018    ANIONGAP 9 08/08/2018     (H) 08/08/2018    BUN 11 08/08/2018    CR 0.66 08/08/2018    GFRESTIMATED 88 08/08/2018    GFRESTBLACK >90 08/08/2018    FELIPE 8.9 08/08/2018        Exercise stress echocardiogram 4/18/2017  Supine bicycle echo stress test  No angina symptoms with exercise.  Hypertensive blood pressure response to exercise (214/80 mmHg at peak  exercise)  No ECG evidence of ischemia.  No stress induced regional wall motion " abnormalities.  Normal resting LV function with EF of approximately 60-65%; with exercise no  significant change in left ventricular cavity size and LVEF slightly lower at  55-60%. The reduced LV performance in this case could be as a result of  elevated LVEDP from the hypertensive response.  Average functional capacity for age.  Normal biventricular function, mildly dilated ascending aorta and no  significant valvular dysfunction noted on screening 2D and Doppler  examination.    Zio-patch , 9/12/2018, 2weeks: personally reviewed,  SVT regular, longest for 30 min, HR ~150 bpm.    Zio patch 3 days 8/16/2018 shows a regular SVT episodes heart rate 137-240 bpm.  Longest episode lasting for 42 minutes    EKG personally reviewed 4/12/2017: Sinus rhythm, normal AL, QRS and QTc durations.  Left anterior fascicular fascicular block noted.      Assessment and Plan:   Ms. Patricia A Sowada is a 73 year old  female with PMH significant for hypertension.    1.  Palpitations with SVT re-detected on recent zio patch: The patient had a sustained episode on the day of the cataract surgery on 9/26. The surgery was cancelled. The SVT episode terminated with cough suggesting likely AVNRT/AVRT as mechanism.  The patient still reports mild palpitation symptoms. She has been hemodynamically stable during episodes with no lightheaded or dizzy feeling.  No syncope. She is currently on metoprolol 50 mg daily. I recommended to increase the dose to 75 mg daily.  I did discuss with her the option of RFA. I will further discuss this during our next clinic visit in 3 months.    2.  Hypertension: Well controlled with hydrochlorothiazide 12.5 mg.    Discontinued ASA for primary prevention due to lack of benefit on recent large scale study.    The patient is cleared to undergo cataract surgery.    RTC 3 months.    A total of 30 minutes spent face-to-face with greater than 50% of the time spent in counseling and coordinating cares of the issues  above.     Please donot hesitate to contact me if you have any questions or concerns. Again, thank you for allowing me to participate in the care of your patient.    Pamela VANCE MD  [facility] Division of Cardiology  Pager [phone]    Jazmín Chopra MD  Opthalmology

## 2018-10-10 NOTE — MR AVS SNAPSHOT
After Visit Summary   10/10/2018    Patricia A Sowada    MRN: [id number]           Patient Information     Date Of Birth          1945        Visit Information        Provider Department      10/10/2018 3:00 PM Pamela Cotton MD [facility] AT [facility]        Today's Diagnoses     Paroxysmal supraventricular tachycardia (H)          Care Instructions    Thank you for coming to the [facility] Heart @ [facility]; please note the following instructions:    1. Cardiac cleared for cataract surgery.    2. Stop Aspirin.    3. Dr. Cotton has increased your Metoprolol to 75mg daily. She has sent a prescription to your pharmacy for 50mg. Please take one 50mg tablet with a 25mg tablet at the same time to equal 75mg a day.    4. 3 month follow up with Dr. Cotton. please see following pages for appointment detail information.          If you have any questions regarding your visit please contact your care team:     Cardiology  Telephone Number   Meka HARMON, SUSHANT STEEN, RN   Deb HORN, YUMIKO STEWART MA   [phone]    *After hours: [phone]   For scheduling appts:     [phone] or    [phone] (select option 1)    *After hours: [phone]     For the Device Clinic (Pacemakers and ICD's)  RN's :  Carmelita De Paz   During business hours: [phone]    *After business hours:  [phone] (select option 4)      Normal test result notifications will be released via Flimmer or mailed within 7 business days.  All other test results, will be communicated via telephone once reviewed by your cardiologist.    If you need a medication refill please contact your pharmacy.  Please allow 3 business days for your refill to be completed.    As always, thank you for trusting us with your health care needs!    Treatment for Supraventricular Tachycardia  Supraventricular tachycardia (SVT) is a type of abnormally fast heartbeat. The heart normally beats 60 to 100  beats per minute. With SVT, the heart beats more than 100 times a minute. It may beat as fast as 250 times a minute. This is caused by a problem in the electrical system of the heart. It can lessen the amount of blood pumped through the heart.  Types of treatment  You may not need treatment for SVT if you have only had one episode. If you do need treatment, there are several kinds. They include:    Valsalva maneuver. This is a way to increase pressure in the abdomen and chest. It can correct your heart rhythm right away. To do it, you bear down with your stomach muscles, as though you are trying to have a bowel movement.    Carotid massage. Your healthcare provider may gently rub the carotid artery in your neck. This can also help correct the heart rhythm right away.    Medicine. There are various kinds you can take. Calcium channel blockers or adenosine can help correct heart rhythm right away. If you have SVT only 1 or 2 times a year, you may take beta-blockers or calcium channel medicine as needed. If your SVT is more frequent, you may need to take medicine every day. Some people may need to take several medicines to prevent episodes of SVT.    Electrocardioversion. This is a shock to the heart to restart a normal rhythm right away. This may be done if you have a severe episode of SVT.    Catheter ablation. This can help permanently stop SVT. Your healthcare provider puts a thin, flexible tube (catheter) into a blood vessel in the groin. He or she then gently pushes it up into your heart. The area of your heart that causes your SVT is then burned. This prevents that area from starting a signal that causes SVT.   Lifestyle changes to help prevent SVT episodes  Your healthcare provider might suggest other ways to help prevent SVT, such as:    Having less alcohol and caffeine    Not smoking    Lowering your stress    Eating foods that are healthy for your heart  When to call your healthcare provider  Call your  healthcare provider if you have any of the following:    Sudden shortness of breath (call 911)    Severe palpitations    Severe dizziness    Severe chest pain    Symptoms that are happening more often   Date Last Reviewed: 8/10/2015    5240-6430 [employer]. [address] [place] [address]. All rights reserved. This information is not intended as a substitute for professional medical care. Always follow your healthcare professional's instructions.                Follow-ups after your visit        Additional Services     Follow-Up with Cardiologist                 Your next 10 appointments already scheduled     Jan 16, 2019  2:00 PM CST   Return Visit with Pamela Cotton MD   [facility] AT [facility] ([facility])    [address] [place] [address] [phone] [phone]              Future tests that were ordered for you today     Open Future Orders        Priority Expected Expires Ordered    Follow-Up with Cardiologist Routine 1/8/2019 10/10/2019 10/10/2018            Who to contact     If you have questions or need follow up information about today's clinic visit or your schedule please contact [facility] AT [facility] directly at [phone].  Normal or non-critical lab and imaging results will be communicated to you by MyChart, letter or phone within 4 business days after the clinic has received the results. If you do not hear from us within 7 days, please contact the clinic through MyChart or phone. If you have a critical or abnormal lab result, we will notify you by phone as soon as possible.  Submit refill requests through [employer] or call your pharmacy and they will forward the refill request to us. Please allow 3 business days for your refill to be completed.          Additional Information About Your Visit        Care EveryWhere ID     This is your Care EveryWhere ID. This could be used by other  "organizations to access your [facility] medical records  [id number]        Your Vitals Were     Pulse Height Pulse Oximetry BMI (Body Mass Index)          70 1.702 m (5' 7\") 99% 26.81 kg/m2         Blood Pressure from Last 3 Encounters:   10/10/18 137/78   09/04/18 144/85   08/20/18 132/76    Weight from Last 3 Encounters:   10/10/18 77.7 kg (171 lb 3.2 oz)   09/04/18 75.3 kg (166 lb)   08/20/18 75.3 kg (166 lb)                 Today's Medication Changes          These changes are accurate as of 10/10/18  3:59 PM.  If you have any questions, ask your nurse or doctor.               These medicines have changed or have updated prescriptions.        Dose/Directions    * metoprolol succinate 25 MG 24 hr tablet   Commonly known as:  TOPROL-XL   This may have changed:  how much to take   Used for:  Paroxysmal supraventricular tachycardia (H)   Changed by:  Pamela Cotton MD        Dose:  25 mg   Take 1 tablet (25 mg) by mouth daily   Quantity:  90 tablet   Refills:  3       * metoprolol succinate 50 MG 24 hr tablet   Commonly known as:  TOPROL-XL   This may have changed:  You were already taking a medication with the same name, and this prescription was added. Make sure you understand how and when to take each.   Used for:  Paroxysmal supraventricular tachycardia (H)   Changed by:  Pamela Cotton MD        Dose:  50 mg   Take 1 tablet (50 mg) by mouth daily   Quantity:  90 tablet   Refills:  3       * Notice:  This list has 2 medication(s) that are the same as other medications prescribed for you. Read the directions carefully, and ask your doctor or other care provider to review them with you.      Stop taking these medicines if you haven't already. Please contact your care team if you have questions.     aspirin 81 MG EC tablet   Stopped by:  Pamela Cotton MD                Where to get your medicines      These medications were sent to [place] [address] [place] [facility] [place] - [address]  [address] [place] [address]    "  Phone:  [phone]     metoprolol succinate 50 MG 24 hr tablet                Primary Care Provider Office Phone # Fax #    Trae Medina [phone]       [facility] [address]        Equal Access to Services     LIZETT JOSEPH : Gretchen jacobo hadasho Soomaali, waaxda luqadaha, qaybta kaalmada adeegyada, mini serra. So [facility] [phone].    ATENCIÓN: Si habla español, tiene a espinosa disposición servicios gratuitos de asistencia lingüística. Sayra al [phone].    We comply with applicable federal civil rights laws and [place] laws. We do not discriminate on the basis of race, color, national origin, age, disability, sex, sexual orientation, or gender identity.            Thank you!     Thank you for choosing [facility] PHYSICIANS HEART AT [facility]  for your care. Our goal is always to provide you with excellent care. Hearing back from our patients is one way we can continue to improve our services. Please take a few minutes to complete the written survey that you may receive in the mail after your visit with us. Thank you!             Your Updated Medication List - Protect others around you: Learn how to safely use, store and throw away your medicines at www.disposemymeds.org.          This list is accurate as of 10/10/18  3:59 PM.  Always use your most recent med list.                   Brand Name Dispense Instructions for use Diagnosis    hydrochlorothiazide 12.5 MG capsule    MICROZIDE    90 capsule    TAKE 1 CAPSULE BY MOUTH DAILY    Hypertension goal BP (blood pressure) < 140/90       latanoprost 0.005 % ophthalmic solution    XALATAN     Place 1 drop into both eyes At Bedtime        * metoprolol succinate 25 MG 24 hr tablet    TOPROL-XL    90 tablet    Take 1 tablet (25 mg) by mouth daily    Paroxysmal supraventricular tachycardia (H)       * metoprolol succinate 50 MG 24 hr tablet    TOPROL-XL    90 tablet     Take 1 tablet (50 mg) by mouth daily    Paroxysmal supraventricular tachycardia (H)       * Notice:  This list has 2 medication(s) that are the same as other medications prescribed for you. Read the directions carefully, and ask your doctor or other care provider to review them with you.

## 2018-10-10 NOTE — PATIENT INSTRUCTIONS
Thank you for coming to the [facility] @ [facility]; please note the following instructions:    1. Cardiac cleared for cataract surgery.    2. Stop Aspirin.    3. Dr. Cotton has increased your Metoprolol to 75mg daily. She has sent a prescription to your pharmacy for 50mg. Please take one 50mg tablet with a 25mg tablet at the same time to equal 75mg a day.    4. 3 month follow up with Dr. Cotton. please see following pages for appointment detail information.          If you have any questions regarding your visit please contact your care team:     Cardiology  Telephone Number   Meka HARMON, RN  Jessica STEEN, RN   Deb HORN, YUMIKO STEWART MA   [phone]    *After hours: [phone]   For scheduling appts:     [phone] or    [phone] (select option 1)    *After hours: [phone]     For the Device Clinic (Pacemakers and ICD's)  RN's :  Carmelita De Paz   During business hours: [phone]    *After business hours:  [phone] (select option 4)      Normal test result notifications will be released via VantageILM or mailed within 7 business days.  All other test results, will be communicated via telephone once reviewed by your cardiologist.    If you need a medication refill please contact your pharmacy.  Please allow 3 business days for your refill to be completed.    As always, thank you for trusting us with your health care needs!    Treatment for Supraventricular Tachycardia  Supraventricular tachycardia (SVT) is a type of abnormally fast heartbeat. The heart normally beats 60 to 100 beats per minute. With SVT, the heart beats more than 100 times a minute. It may beat as fast as 250 times a minute. This is caused by a problem in the electrical system of the heart. It can lessen the amount of blood pumped through the heart.  Types of treatment  You may not need treatment for SVT if you have only had one episode. If you do need treatment, there are several kinds. They  include:    Valsalva maneuver. This is a way to increase pressure in the abdomen and chest. It can correct your heart rhythm right away. To do it, you bear down with your stomach muscles, as though you are trying to have a bowel movement.    Carotid massage. Your healthcare provider may gently rub the carotid artery in your neck. This can also help correct the heart rhythm right away.    Medicine. There are various kinds you can take. Calcium channel blockers or adenosine can help correct heart rhythm right away. If you have SVT only 1 or 2 times a year, you may take beta-blockers or calcium channel medicine as needed. If your SVT is more frequent, you may need to take medicine every day. Some people may need to take several medicines to prevent episodes of SVT.    Electrocardioversion. This is a shock to the heart to restart a normal rhythm right away. This may be done if you have a severe episode of SVT.    Catheter ablation. This can help permanently stop SVT. Your healthcare provider puts a thin, flexible tube (catheter) into a blood vessel in the groin. He or she then gently pushes it up into your heart. The area of your heart that causes your SVT is then burned. This prevents that area from starting a signal that causes SVT.   Lifestyle changes to help prevent SVT episodes  Your healthcare provider might suggest other ways to help prevent SVT, such as:    Having less alcohol and caffeine    Not smoking    Lowering your stress    Eating foods that are healthy for your heart  When to call your healthcare provider  Call your healthcare provider if you have any of the following:    Sudden shortness of breath (call 911)    Severe palpitations    Severe dizziness    Severe chest pain    Symptoms that are happening more often   Date Last Reviewed: 8/10/2015    6492-9457 [employer]. [address] [place] [address], [place] [address]. All rights reserved. This information is not intended as a substitute for  professional medical care. Always follow your healthcare professional's instructions.

## 2018-10-10 NOTE — NURSING NOTE
"Chief Complaint   Patient presents with     Follow Up For      zio results SVT       Initial /78 (BP Location: Left arm, Patient Position: Chair, Cuff Size: Adult Regular)  Pulse 70  Ht 1.702 m (5' 7\")  Wt 77.7 kg (171 lb 3.2 oz)  SpO2 99%  BMI 26.81 kg/m2 Estimated body mass index is 26.81 kg/(m^2) as calculated from the following:    Height as of this encounter: 1.702 m (5' 7\").    Weight as of this encounter: 77.7 kg (171 lb 3.2 oz)..  BP completed using cuff size: regular    Cathi Gonzales CMA      "

## 2018-10-23 ENCOUNTER — TELEPHONE (OUTPATIENT)
Dept: CARDIOLOGY | Facility: CLINIC | Age: 73
End: 2018-10-23

## 2018-10-23 DIAGNOSIS — I47.10 PAROXYSMAL SUPRAVENTRICULAR TACHYCARDIA (H): ICD-10-CM

## 2018-10-23 RX ORDER — METOPROLOL SUCCINATE 25 MG/1
75 TABLET, EXTENDED RELEASE ORAL DAILY
Qty: 275 TABLET | Refills: 3 | Status: SHIPPED | OUTPATIENT
Start: 2018-10-23 | End: 2018-10-23

## 2018-10-23 RX ORDER — METOPROLOL SUCCINATE 25 MG/1
25 TABLET, EXTENDED RELEASE ORAL DAILY
Qty: 90 TABLET | Refills: 3 | Status: SHIPPED | OUTPATIENT
Start: 2018-10-23 | End: 2019-01-16

## 2018-10-23 NOTE — TELEPHONE ENCOUNTER
Reviewed with pt. Pt is taking 1 tab of 25mg and 1 tab of 50mg/day to equal 75mg/day but needs extra 25mg tablets because she was doubling up until  Can sent a new script over which is why she is short. New script sent to pharmacy.    Jessica Hester RN

## 2018-10-23 NOTE — TELEPHONE ENCOUNTER
Left voicemail with pt to call clinic back to verify dose of Metoprolol if she is taking 25 mg tablets x3 to equal 75mg per day or a 50mg plus 25 mg tablet.    Jessica Hester RN

## 2018-10-23 NOTE — TELEPHONE ENCOUNTER
Received fax on 10/23/18 from [facility] in [place] requesting refill(s) for the following medication(s):    1.   Rx Number:  [id number]   Drug:  Metoprolol Succinate ER 25 MG        Sig: Take 1 tablet by mouth every day    Prescribed Qty:  90    Date Written:  09/04/2018       Patient's Last Cardiology Appointment:  10/10/18 with Dr. Pamela Cotton    Patient's Next Cardiology Appointment:  01/16/2019 with Dr. Pamela Cotton     * ALTERNATIVE REQUESTED: PLEASE SEND NEW PRESCRIPTION. PATIENT NOW TAKING 75 MG DAILY AND IS OUT OF MEDICATION.  PLEASE SEND NEW PRESCRIPTION TO TAKE 3 TABLETS DAILY.    Refill encounter routed to [facility]_Cardiology Pool.  DIDI ArmijoM.A.

## 2018-10-31 ENCOUNTER — TELEPHONE (OUTPATIENT)
Dept: CARDIOLOGY | Facility: CLINIC | Age: 73
End: 2018-10-31

## 2018-10-31 NOTE — TELEPHONE ENCOUNTER
Writer spoke with Carmelita who states that she does have the office note from 10/10 and Myla is all set to go.  Meka Miguel RN

## 2018-10-31 NOTE — TELEPHONE ENCOUNTER
"Patient left a detailed message stating that she is scheduled for her cataract surgery tomorrow 11/1 and is worried that the cataract surgery will get cancelled \"like last time\" due to SVT found on the EKG.  Patient left the [facility] pre-op number [phone] for cardiology to call to discuss that she is cleared to have cataract surgery on a cardiac stand point because she wants to avoid the mess of having to reschedule again.  Writer attempted to call the number given and left a detailed message stating that Myla can have her cataract surgery per Dr. Cotton and can fax records if necessary.  Also left the RN cardiology # [phone] and [place] # [phone] and Dr. Cotton's pager # [phone] if needed.    Patient also informed.    Meka Miguel, RN  Care Coordinator  [facility] [place] [facility]  [phone]      "

## 2018-11-02 NOTE — TELEPHONE ENCOUNTER
Reviewed with Dr. Cotton. No changes at this time. Pt aware and will call clinic if anything comes up.    Jessica Hester RN

## 2018-11-02 NOTE — TELEPHONE ENCOUNTER
"Pt called into clinic to state that her cataract surgery went well and she only has a few \"episodes\" that were short lived. States that her HR was only up to 140 and only lasted 20 seconds. No medications were administered during her surgery for her heart. Pt just wanted to update Dr. Cotton to make sure there was nothing else for her to do.     Message routed to Dr. Cotton. Will f/u with pt if new orders given.     Jessica Hester RN    "

## 2019-01-02 ENCOUNTER — TELEPHONE (OUTPATIENT)
Dept: CARDIOLOGY | Facility: CLINIC | Age: 74
End: 2019-01-02

## 2019-01-02 NOTE — TELEPHONE ENCOUNTER
Reason for call:  Other     Patient called regarding (reason for call): call back    Additional comments: Patient is calling because she is on metoprolol succinate and wants to know if she can take dramamine with that medication as she is going on vacation in a week. Please call back    Phone number to reach patient:  Home number on file [phone] (home)    Best Time:  any    Can we leave a detailed message on this number?  YES

## 2019-01-02 NOTE — TELEPHONE ENCOUNTER
Dramamine + Toprol interaction NONE found for drug interactions using micromedics tool.  Patient informed via detailed message on voicemail and to call clinic back with questions.    Meka Miguel RN

## 2019-01-16 ENCOUNTER — OFFICE VISIT (OUTPATIENT)
Dept: CARDIOLOGY | Facility: CLINIC | Age: 74
End: 2019-01-16
Payer: MEDICARE

## 2019-01-16 VITALS
OXYGEN SATURATION: 99 % | SYSTOLIC BLOOD PRESSURE: 145 MMHG | WEIGHT: 176 LBS | DIASTOLIC BLOOD PRESSURE: 85 MMHG | HEART RATE: 65 BPM | BODY MASS INDEX: 27.57 KG/M2

## 2019-01-16 DIAGNOSIS — R00.2 PALPITATIONS: Primary | ICD-10-CM

## 2019-01-16 DIAGNOSIS — I10 ESSENTIAL HYPERTENSION: ICD-10-CM

## 2019-01-16 DIAGNOSIS — I47.10 PAROXYSMAL SUPRAVENTRICULAR TACHYCARDIA (H): ICD-10-CM

## 2019-01-16 PROCEDURE — 99214 OFFICE O/P EST MOD 30 MIN: CPT | Performed by: INTERNAL MEDICINE

## 2019-01-16 RX ORDER — METOPROLOL SUCCINATE 100 MG/1
100 TABLET, EXTENDED RELEASE ORAL DAILY
Qty: 90 TABLET | Refills: 3 | Status: SHIPPED | OUTPATIENT
Start: 2019-01-16 | End: 2019-09-24

## 2019-01-16 NOTE — PATIENT INSTRUCTIONS
Thank you for coming to the [facility] @ [place]; please note the following instructions:    1.   Dr. Cotton has increased metoprolol 100 mg daily.  A new prescription was sent to your preferred pharmacy.    2.  Follow up on an as needed basis.        If you have any questions regarding your visit please contact your care team:     Cardiology  Telephone Number   Meka HARMON, RN  Jessica STEEN, RN   Deb HORN, YUMIKO STEWART MA   [phone]    *After hours: [phone]   For scheduling appts:     [phone] or    [phone] (select option 1)    *After hours: [phone]     For the Device Clinic (Pacemakers and ICD's)  RN's :  Carmelita De Paz   During business hours: [phone]    *After business hours:  [phone] (select option 4)      Normal test result notifications will be released via Iceotope or mailed within 7 business days.  All other test results, will be communicated via telephone once reviewed by your cardiologist.    If you need a medication refill please contact your pharmacy.  Please allow 3 business days for your refill to be completed.    As always, thank you for trusting us with your health care needs!

## 2019-01-16 NOTE — PROGRESS NOTES
Referring provider:Adam Larkin MD    Chief complaint: Palpitations    HPI: Ms. Patricia A Sowada is a 73 year old  female with PMH significant for well controlled hypertension.    The patient is here at request of Dr. Medina regarding her SVT episodes captured on recent zio patch monitor.  The patient reports mild fluttering sensation while she was wearing the monitor for 3 days. She denies any associated symptoms like  dizziness, lightheadedness, chest discomfort, dyspnea or syncope.  The patient first noticed palpitations on the day of the cataract surgery on August 16. The procedure was overall uneventful.     She is physically very active and denies any symptoms on exertion as well.  The patient does not have any prior cardiac history.  She is on hydrochlorothiazide 12.5 mg for blood pressure management.  She takes aspirin 81 mg which she holds currently for the expected second cataract surgery on 9/6. The patient is a non-smoker.  No history of diabetes or hyperlipidemia.    Zio patch event monitor on 8/16 showed regular SVT episodes lasting 6 min-42 min with heart rate range 137-240 bpm.  The rhythm is a regular SVT with narrow QRS with sudden onset and offset.    An exercise stress echocardiogram from 2017 showed normal baseline RV/LV function. The stress test was overall normal.  I have reviewed her ECG from 4/12/2017 which shows normal sinus rhythm and left anterior fascicular block.    Medications, personal, family, and social history reviewed with patient and revised.    Interval history 10/10/2018:  The patient returns for follow-up. Few weeks ago on the day of the planned cataract surgery the patient was noted to be in SVT prior to the onset of anesthesia. The patient denies any symptom at that time. She was asked to cough which terminated the tachycardia. Though she remained in SR afterwards the anesthesia team cancelled the procedure and recommended to see cardiology again.  After this episode I  increased her metoprolol dose to 50 mg and she underwent a Zio-patch monitoring for 2 weeks which showed evidence of SVT longest episode lasting 30 minutes. She reports minimal symptoms during monitoring period. She continues to do well from cardiac standpoint. Her symptoms of fluttering improved after starting metoprolol. She still reports short lasting palpitation symptoms. Otherwise no other complaints.    Interval follow up 1/16/2019:  The patient returns for follow up. She has undergone cataract surgery on 11/1/2018 with no complications or induced SVT.  The patient is overall feeling very well with occasional palpitations lasting for a few seconds.  Otherwise she denies chest pain, shortness of breath, syncope, PND or orthopnea.  No other complaints.    PAST MEDICAL HISTORY:  Past Medical History:   Diagnosis Date     Bell's palsy 10/2001     COPD (chronic obstructive pulmonary disease) (H)      HTN (hypertension)      PONV (postoperative nausea and vomiting)        CURRENT MEDICATIONS:  Current Outpatient Medications   Medication Sig Dispense Refill     hydrochlorothiazide (MICROZIDE) 12.5 MG capsule TAKE 1 CAPSULE BY MOUTH DAILY 90 capsule 1     latanoprost (XALATAN) 0.005 % ophthalmic solution Place 1 drop into both eyes At Bedtime  0     metoprolol succinate (TOPROL-XL) 25 MG 24 hr tablet Take 1 tablet (25 mg) by mouth daily 90 tablet 3     metoprolol succinate (TOPROL-XL) 50 MG 24 hr tablet Take 1 tablet (50 mg) by mouth daily 90 tablet 3       PAST SURGICAL HISTORY:  Past Surgical History:   Procedure Laterality Date     BUNIONECTOMY RT/LT  6/2001    right     C NONSPECIFIC PROCEDURE  1996    hammer toe repair, bilat     COLONOSCOPY N/A 10/9/2015    Procedure: COLONOSCOPY;  Surgeon: Aldo Hurtado MD;  Location:  GI     TONSILLECTOMY  1950     TUBAL LIGATION  1980       ALLERGIES:     Allergies   Allergen Reactions     No Clinical Screening - See Comments Nausea and Vomiting      Anesthesia - requires colonoscopy to be done at the hospital due to how sick she becomes with anesthesia     Penicillins Rash       FAMILY HISTORY:  Family History   Problem Relation Age of Onset     Cancer Father         lymphoma     Heart Disease Mother         valve replacement     Breast Cancer Sister         50 y/o     Hypertension Brother      Hypertension Brother      Diabetes No family hx of          SOCIAL HISTORY:  Social History     Tobacco Use     Smoking status: Never Smoker     Smokeless tobacco: Never Used   Substance Use Topics     Alcohol use: No     Drug use: No       ROS:   Constitutional: No fever, chills, or sweats. Weight stable.   Cardiovascular: As per HPI.   Respiratory: No cough, hemoptysis.    GI: No nausea, vomiting, hematemesis, melena, or hematochezia.   : No hematuria.   Hematologic:  No easy bruising, no easy bleeding.    Exam:  /85 (BP Location: Left arm, Patient Position: Chair, Cuff Size: Adult Regular)   Pulse 65   Wt 79.8 kg (176 lb)   SpO2 99%   BMI 27.57 kg/m    GENERAL APPEARANCE: alert and no distress  HEENT: no icterus, no central cyanosis  LYMPH/NECK:JVP not elevated  RESPIRATORY: lungs clear to auscultation - no rales, rhonchi or wheezes, no use of accessory muscles, no retractions, respirations are unlabored, normal respiratory rate  CARDIOVASCULAR: regular rhythm, normal S1, S2, no S3 or S4 and no murmur, click or rub, precordium quiet with normal PMI.  EXTREMITIES: no edema  NEURO: alert, normal speech,and affect     I have reviewed the labs and personally reviewed the imaging below (zio patch ) and made my comment in the assessment and plan.    Labs:  CBC RESULTS:   Lab Results   Component Value Date    WBC 11.0 12/25/2016    RBC 4.23 12/25/2016    HGB 15.2 08/08/2018    HCT 38.7 12/25/2016    MCV 92 12/25/2016    MCH 29.3 12/25/2016    MCHC 32.0 12/25/2016    RDW 14.0 12/25/2016     12/25/2016       BMP RESULTS:  Lab Results   Component Value Date      08/08/2018    POTASSIUM 5.0 08/08/2018    CHLORIDE 102 08/08/2018    CO2 27 08/08/2018    ANIONGAP 9 08/08/2018     (H) 08/08/2018    BUN 11 08/08/2018    CR 0.66 08/08/2018    GFRESTIMATED 88 08/08/2018    GFRESTBLACK >90 08/08/2018    FELIPE 8.9 08/08/2018        Exercise stress echocardiogram 4/18/2017  Supine bicycle echo stress test  No angina symptoms with exercise.  Hypertensive blood pressure response to exercise (214/80 mmHg at peak  exercise)  No ECG evidence of ischemia.  No stress induced regional wall motion abnormalities.  Normal resting LV function with EF of approximately 60-65%; with exercise no  significant change in left ventricular cavity size and LVEF slightly lower at  55-60%. The reduced LV performance in this case could be as a result of  elevated LVEDP from the hypertensive response.  Average functional capacity for age.  Normal biventricular function, mildly dilated ascending aorta and no  significant valvular dysfunction noted on screening 2D and Doppler  examination.    Zio-patch , 9/12/2018, 2weeks: personally reviewed,  SVT regular, longest for 30 min, HR ~150 bpm.    Zio patch 3 days 8/16/2018 shows a regular SVT episodes heart rate 137-240 bpm.  Longest episode lasting for 42 minutes    EKG personally reviewed 4/12/2017: Sinus rhythm, normal TN, QRS and QTc durations.  Left anterior fascicular fascicular block noted.      Assessment and Plan:   Ms. Patricia A Sowada is a 73 year old  female with PMH significant for hypertension.    1.  Palpitations with SVT detected on zio patch: The patient had a sustained episode on the day of the cataract surgery on 9/26. The surgery was cancelled. The SVT episode terminated with cough suggesting likely AVNRT/AVRT as mechanism. She has been hemodynamically stable during episodes with no lightheaded or dizzy feeling.  No syncope. She is currently on metoprolol 75 mg daily. The patient's symptoms gradually resolved with higher dose of  metoprolol.  I have started with 25mg and gradually increased to 50 and then to 75 mg.  The patient still reports very occasional palpitations.  Since the blood pressure is mildly high as well I recommended to increase metoprolol dose 100 mg daily today.  I did discuss with her the option of RFA.     2.  Hypertension: Mildly elevated in clinic today.  The patient does not monitor blood pressure at home.  Recommended home monitoring.  Continue hydrochlorothiazide 12.5 mg.    Return to clinic as needed.    A total of 30 minutes spent face-to-face with greater than 50% of the time spent in counseling and coordinating cares of the issues above.     Please donot hesitate to contact me if you have any questions or concerns. Again, thank you for allowing me to participate in the care of your patient.    Pamela VANCE MD  [facility] Division of Cardiology  Pager [phone]

## 2019-01-16 NOTE — NURSING NOTE
"Chief Complaint   Patient presents with     RECHECK     3 month follow up Benign essential hypertension, Paroxysmal supraventricular tachycardia. Pt reports occasional heart fluttering.       Initial /85 (BP Location: Left arm, Patient Position: Chair, Cuff Size: Adult Regular)   Pulse 65   Wt 79.8 kg (176 lb)   SpO2 99%   BMI 27.57 kg/m   Estimated body mass index is 27.57 kg/m  as calculated from the following:    Height as of 10/10/18: 1.702 m (5' 7\").    Weight as of this encounter: 79.8 kg (176 lb)..  BP completed using cuff size: regular    Mirian Saxena MA    "

## 2019-01-16 NOTE — LETTER
1/16/2019      RE: Patricia A Sowada  [address] [address]       Dear Colleague,    Thank you for the opportunity to participate in the care of your patient, Patricia A Sowada, at the [facility] HEART AT [facility]. Please see a copy of my visit note below.    Referring provider:Adam Larkin MD    Chief complaint: Palpitations    HPI: Ms. Patricia A Sowada is a 73 year old  female with PMH significant for well controlled hypertension.    The patient is here at request of Dr. Medina regarding her SVT episodes captured on recent zio patch monitor.  The patient reports mild fluttering sensation while she was wearing the monitor for 3 days. She denies any associated symptoms like  dizziness, lightheadedness, chest discomfort, dyspnea or syncope.  The patient first noticed palpitations on the day of the cataract surgery on August 16. The procedure was overall uneventful.     She is physically very active and denies any symptoms on exertion as well.  The patient does not have any prior cardiac history.  She is on hydrochlorothiazide 12.5 mg for blood pressure management.  She takes aspirin 81 mg which she holds currently for the expected second cataract surgery on 9/6. The patient is a non-smoker.  No history of diabetes or hyperlipidemia.    Zio patch event monitor on 8/16 showed regular SVT episodes lasting 6 min-42 min with heart rate range 137-240 bpm.  The rhythm is a regular SVT with narrow QRS with sudden onset and offset.    An exercise stress echocardiogram from 2017 showed normal baseline RV/LV function. The stress test was overall normal.  I have reviewed her ECG from 4/12/2017 which shows normal sinus rhythm and left anterior fascicular block.    Medications, personal, family, and social history reviewed with patient and revised.    Interval history 10/10/2018:  The patient returns for follow-up. Few  weeks ago on the day of the planned cataract surgery the patient was noted to be in SVT prior to the onset of anesthesia. The patient denies any symptom at that time. She was asked to cough which terminated the tachycardia. Though she remained in SR afterwards the anesthesia team cancelled the procedure and recommended to see cardiology again.  After this episode I increased her metoprolol dose to 50 mg and she underwent a Zio-patch monitoring for 2 weeks which showed evidence of SVT longest episode lasting 30 minutes. She reports minimal symptoms during monitoring period. She continues to do well from cardiac standpoint. Her symptoms of fluttering improved after starting metoprolol. She still reports short lasting palpitation symptoms. Otherwise no other complaints.    Interval follow up 1/16/2019:  The patient returns for follow up. She has undergone cataract surgery on 11/1/2018 with no complications or induced SVT.  The patient is overall feeling very well with occasional palpitations lasting for a few seconds.  Otherwise she denies chest pain, shortness of breath, syncope, PND or orthopnea.  No other complaints.    PAST MEDICAL HISTORY:  Past Medical History:   Diagnosis Date     Bell's palsy 10/2001     COPD (chronic obstructive pulmonary disease) (H)      HTN (hypertension)      PONV (postoperative nausea and vomiting)        CURRENT MEDICATIONS:  Current Outpatient Medications   Medication Sig Dispense Refill     hydrochlorothiazide (MICROZIDE) 12.5 MG capsule TAKE 1 CAPSULE BY MOUTH DAILY 90 capsule 1     latanoprost (XALATAN) 0.005 % ophthalmic solution Place 1 drop into both eyes At Bedtime  0     metoprolol succinate (TOPROL-XL) 25 MG 24 hr tablet Take 1 tablet (25 mg) by mouth daily 90 tablet 3     metoprolol succinate (TOPROL-XL) 50 MG 24 hr tablet Take 1 tablet (50 mg) by mouth daily 90 tablet 3       PAST SURGICAL HISTORY:  Past Surgical History:   Procedure Laterality Date     BUNIONECTOMY RT/LT   6/2001    right     C NONSPECIFIC PROCEDURE  1996    hammer toe repair, bilat     COLONOSCOPY N/A 10/9/2015    Procedure: COLONOSCOPY;  Surgeon: Aldo Hurtado MD;  Location:  GI     TONSILLECTOMY  1950     TUBAL LIGATION  1980       ALLERGIES:     Allergies   Allergen Reactions     No Clinical Screening - See Comments Nausea and Vomiting     Anesthesia - requires colonoscopy to be done at the hospital due to how sick she becomes with anesthesia     Penicillins Rash       FAMILY HISTORY:  Family History   Problem Relation Age of Onset     Cancer Father         lymphoma     Heart Disease Mother         valve replacement     Breast Cancer Sister         50 y/o     Hypertension Brother      Hypertension Brother      Diabetes No family hx of          SOCIAL HISTORY:  Social History     Tobacco Use     Smoking status: Never Smoker     Smokeless tobacco: Never Used   Substance Use Topics     Alcohol use: No     Drug use: No       ROS:   Constitutional: No fever, chills, or sweats. Weight stable.   Cardiovascular: As per HPI.   Respiratory: No cough, hemoptysis.    GI: No nausea, vomiting, hematemesis, melena, or hematochezia.   : No hematuria.   Hematologic:  No easy bruising, no easy bleeding.    Exam:  /85 (BP Location: Left arm, Patient Position: Chair, Cuff Size: Adult Regular)   Pulse 65   Wt 79.8 kg (176 lb)   SpO2 99%   BMI 27.57 kg/m     GENERAL APPEARANCE: alert and no distress  HEENT: no icterus, no central cyanosis  LYMPH/NECK:JVP not elevated  RESPIRATORY: lungs clear to auscultation - no rales, rhonchi or wheezes, no use of accessory muscles, no retractions, respirations are unlabored, normal respiratory rate  CARDIOVASCULAR: regular rhythm, normal S1, S2, no S3 or S4 and no murmur, click or rub, precordium quiet with normal PMI.  EXTREMITIES: no edema  NEURO: alert, normal speech,and affect     I have reviewed the labs and personally reviewed the imaging below (zio patch ) and  made my comment in the assessment and plan.    Labs:  CBC RESULTS:   Lab Results   Component Value Date    WBC 11.0 12/25/2016    RBC 4.23 12/25/2016    HGB 15.2 08/08/2018    HCT 38.7 12/25/2016    MCV 92 12/25/2016    MCH 29.3 12/25/2016    MCHC 32.0 12/25/2016    RDW 14.0 12/25/2016     12/25/2016       BMP RESULTS:  Lab Results   Component Value Date     08/08/2018    POTASSIUM 5.0 08/08/2018    CHLORIDE 102 08/08/2018    CO2 27 08/08/2018    ANIONGAP 9 08/08/2018     (H) 08/08/2018    BUN 11 08/08/2018    CR 0.66 08/08/2018    GFRESTIMATED 88 08/08/2018    GFRESTBLACK >90 08/08/2018    FELIPE 8.9 08/08/2018        Exercise stress echocardiogram 4/18/2017  Supine bicycle echo stress test  No angina symptoms with exercise.  Hypertensive blood pressure response to exercise (214/80 mmHg at peak  exercise)  No ECG evidence of ischemia.  No stress induced regional wall motion abnormalities.  Normal resting LV function with EF of approximately 60-65%; with exercise no  significant change in left ventricular cavity size and LVEF slightly lower at  55-60%. The reduced LV performance in this case could be as a result of  elevated LVEDP from the hypertensive response.  Average functional capacity for age.  Normal biventricular function, mildly dilated ascending aorta and no  significant valvular dysfunction noted on screening 2D and Doppler  examination.    Zio-patch , 9/12/2018, 2weeks: personally reviewed,  SVT regular, longest for 30 min, HR ~150 bpm.    Zio patch 3 days 8/16/2018 shows a regular SVT episodes heart rate 137-240 bpm.  Longest episode lasting for 42 minutes    EKG personally reviewed 4/12/2017: Sinus rhythm, normal SD, QRS and QTc durations.  Left anterior fascicular fascicular block noted.      Assessment and Plan:   Ms. Patricia A Sowada is a 73 year old  female with PMH significant for hypertension.    1.  Palpitations with SVT detected on zio patch: The patient had a sustained  episode on the day of the cataract surgery on 9/26. The surgery was cancelled. The SVT episode terminated with cough suggesting likely AVNRT/AVRT as mechanism. She has been hemodynamically stable during episodes with no lightheaded or dizzy feeling.  No syncope. She is currently on metoprolol 75 mg daily. The patient's symptoms gradually resolved with higher dose of metoprolol.  I have started with 25mg and gradually increased to 50 and then to 75 mg.  The patient still reports very occasional palpitations.  Since the blood pressure is mildly high as well I recommended to increase metoprolol dose 100 mg daily today.  I did discuss with her the option of RFA.     2.  Hypertension: Mildly elevated in clinic today.  The patient does not monitor blood pressure at home.  Recommended home monitoring.  Continue hydrochlorothiazide 12.5 mg.    Return to clinic as needed.    A total of 30 minutes spent face-to-face with greater than 50% of the time spent in counseling and coordinating cares of the issues above.     Pamela VANCE MD  [facility] Division of Cardiology  Pager [phone]

## 2019-02-21 DIAGNOSIS — I10 HYPERTENSION GOAL BP (BLOOD PRESSURE) < 140/90: ICD-10-CM

## 2019-02-21 NOTE — TELEPHONE ENCOUNTER
Routing refill request to provider for review/approval because:  BP above goal.     Per last OV note:    4. Hypertension goal BP (blood pressure) < 140/90  Stable today, she is nervous today as well. She will continue taking hydrochlorothiazide as directed. Labs today.        Kelechi Luong RN

## 2019-02-21 NOTE — TELEPHONE ENCOUNTER
"Requested Prescriptions   Pending Prescriptions Disp Refills     hydrochlorothiazide (MICROZIDE) 12.5 MG capsule [Pharmacy Med Name: HYDROCHLOROTHIAZIDE 12.5 MG CP]  Last Written Prescription Date:  8/8/2018  Last Fill Quantity: 90 capsule,  # refills: 1   Last office visit: 8/20/2018 with prescribing provider:  YEVGENIY Medina   Future Office Visit:     90 capsule 1     Sig: TAKE 1 CAPSULE BY MOUTH EVERY DAY    Diuretics (Including Combos) Protocol Failed - 2/21/2019  1:07 AM       Failed - Blood pressure under 140/90 in past 12 months    BP Readings from Last 3 Encounters:   01/16/19 145/85   10/10/18 137/78   09/04/18 144/85          Passed - Recent (12 mo) or future (30 days) visit within the authorizing provider's specialty    Patient had office visit in the last 12 months or has a visit in the next 30 days with authorizing provider or within the authorizing provider's specialty.  See \"Patient Info\" tab in inbasket, or \"Choose Columns\" in Meds & Orders section of the refill encounter.             Passed - Medication is active on med list       Passed - Patient is age 18 or older       Passed - No active pregancy on record       Passed - Normal serum creatinine on file in past 12 months    Recent Labs   Lab Test 08/08/18  0952   CR 0.66             Passed - Normal serum potassium on file in past 12 months    Recent Labs   Lab Test 08/08/18  0952   POTASSIUM 5.0           Passed - Normal serum sodium on file in past 12 months    Recent Labs   Lab Test 08/08/18  0952                Passed - No positive pregnancy test in past 12 months          "

## 2019-02-22 RX ORDER — HYDROCHLOROTHIAZIDE 12.5 MG/1
CAPSULE ORAL
Qty: 90 CAPSULE | Refills: 1 | Status: SHIPPED | OUTPATIENT
Start: 2019-02-22 | End: 2019-07-29

## 2019-04-19 ENCOUNTER — TELEPHONE (OUTPATIENT)
Dept: FAMILY MEDICINE | Facility: CLINIC | Age: 74
End: 2019-04-19

## 2019-04-19 NOTE — TELEPHONE ENCOUNTER
Panel Management Review      Patient has the following on her problem list:     Hypertension   Last three blood pressure readings:  BP Readings from Last 3 Encounters:   01/16/19 145/85   10/10/18 137/78   09/04/18 144/85     Blood pressure: FAILED    HTN Guidelines:  Less than 140/90      Composite cancer screening  Chart review shows that this patient is due/due soon for the following Colonoscopy  Summary:    Patient is due/failing the following:   BP CHECK, COLONOSCOPY, DAP, PHQ9 and PHYSICAL    Action needed:   Patient needs office visit for wellness visit. Patient needs to do PHQ9. and BP check.    Type of outreach:    Sent letter.    Questions for provider review:    None                                                                                                                                    Sonia Nielson,        Chart routed to Care Team. Please follow up with patient for PHQ-9 if an appointment is not scheduled.

## 2019-04-19 NOTE — LETTER
April 19, 2019    Patricia A Sowada  [address] [address]    Dear Myla,    We care about your health and have reviewed your health plan. We have reviewed your medical conditions, medication list, and lab results and are making recommendations based on this review, to better manage your health.    You are in particular need of attention regarding:  - Your Depression  - Your High Blood Pressure, Please call to schedule an appointment with your provider or nurse  - Scheduling a Wellness (Physical/Lab) Visit age 65 and older [phone]      Here is a list of Health Maintenance topics that are due now or due soon:  Health Maintenance Due   Topic Date Due     DEPRESSION ACTION PLAN  [birth year]/26/1963     ZOSTER IMMUNIZATION (2 of 3) 03/10/2009     MEDICARE ANNUAL WELLNESS VISIT  11/10/2017     INFLUENZA VACCINE (1) 09/01/2018     COLONOSCOPY Q3 YR  10/09/2018     PHQ-9 Q6 MONTHS  02/08/2019     FALL RISK ASSESSMENT  02/09/2019     LIPID MONITORING Q1 YEAR  02/09/2019     We will be calling you in the next couple of weeks to help you schedule any appointments that are needed.  Please call us at [phone] to address the above recommendations.     Thank you for trusting [facility] with your healthcare needs. We appreciate the opportunity to serve you and look forward to supporting you in the future.    Healthy Regards,    Your Care Team

## 2019-05-10 ENCOUNTER — OFFICE VISIT (OUTPATIENT)
Dept: FAMILY MEDICINE | Facility: CLINIC | Age: 74
End: 2019-05-10
Payer: MEDICARE

## 2019-05-10 ENCOUNTER — TELEPHONE (OUTPATIENT)
Dept: FAMILY MEDICINE | Facility: CLINIC | Age: 74
End: 2019-05-10

## 2019-05-10 VITALS
SYSTOLIC BLOOD PRESSURE: 142 MMHG | WEIGHT: 187 LBS | HEART RATE: 72 BPM | BODY MASS INDEX: 29.35 KG/M2 | HEIGHT: 67 IN | DIASTOLIC BLOOD PRESSURE: 84 MMHG | TEMPERATURE: 97.8 F | OXYGEN SATURATION: 98 %

## 2019-05-10 DIAGNOSIS — N30.01 ACUTE CYSTITIS WITH HEMATURIA: Primary | ICD-10-CM

## 2019-05-10 LAB
ALBUMIN UR-MCNC: NEGATIVE MG/DL
APPEARANCE UR: ABNORMAL
BACTERIA #/AREA URNS HPF: ABNORMAL /HPF
BILIRUB UR QL STRIP: NEGATIVE
COLOR UR AUTO: YELLOW
GLUCOSE UR STRIP-MCNC: NEGATIVE MG/DL
HGB UR QL STRIP: ABNORMAL
KETONES UR STRIP-MCNC: NEGATIVE MG/DL
LEUKOCYTE ESTERASE UR QL STRIP: ABNORMAL
NITRATE UR QL: POSITIVE
NON-SQ EPI CELLS #/AREA URNS LPF: ABNORMAL /LPF
PH UR STRIP: 7.5 PH (ref 5–7)
RBC #/AREA URNS AUTO: ABNORMAL /HPF
SOURCE: ABNORMAL
SP GR UR STRIP: 1.01 (ref 1–1.03)
TRANS CELLS #/AREA URNS HPF: ABNORMAL /HPF
UROBILINOGEN UR STRIP-ACNC: 0.2 EU/DL (ref 0.2–1)
WBC #/AREA URNS AUTO: ABNORMAL /HPF
WBC CLUMPS #/AREA URNS HPF: PRESENT /HPF

## 2019-05-10 PROCEDURE — 87186 SC STD MICRODIL/AGAR DIL: CPT | Performed by: NURSE PRACTITIONER

## 2019-05-10 PROCEDURE — 87086 URINE CULTURE/COLONY COUNT: CPT | Performed by: NURSE PRACTITIONER

## 2019-05-10 PROCEDURE — 99213 OFFICE O/P EST LOW 20 MIN: CPT | Performed by: NURSE PRACTITIONER

## 2019-05-10 PROCEDURE — 81001 URINALYSIS AUTO W/SCOPE: CPT | Performed by: NURSE PRACTITIONER

## 2019-05-10 PROCEDURE — 87088 URINE BACTERIA CULTURE: CPT | Performed by: NURSE PRACTITIONER

## 2019-05-10 RX ORDER — SULFAMETHOXAZOLE/TRIMETHOPRIM 800-160 MG
1 TABLET ORAL 2 TIMES DAILY
Qty: 14 TABLET | Refills: 0 | Status: SHIPPED | OUTPATIENT
Start: 2019-05-10 | End: 2019-09-24

## 2019-05-10 RX ORDER — SULFAMETHOXAZOLE/TRIMETHOPRIM 800-160 MG
1 TABLET ORAL ONCE
Status: CANCELLED | OUTPATIENT
Start: 2019-05-10 | End: 2019-05-10

## 2019-05-10 ASSESSMENT — ANXIETY QUESTIONNAIRES
6. BECOMING EASILY ANNOYED OR IRRITABLE: NOT AT ALL
IF YOU CHECKED OFF ANY PROBLEMS ON THIS QUESTIONNAIRE, HOW DIFFICULT HAVE THESE PROBLEMS MADE IT FOR YOU TO DO YOUR WORK, TAKE CARE OF THINGS AT HOME, OR GET ALONG WITH OTHER PEOPLE: NOT DIFFICULT AT ALL
5. BEING SO RESTLESS THAT IT IS HARD TO SIT STILL: NOT AT ALL
7. FEELING AFRAID AS IF SOMETHING AWFUL MIGHT HAPPEN: NOT AT ALL
GAD7 TOTAL SCORE: 0
1. FEELING NERVOUS, ANXIOUS, OR ON EDGE: NOT AT ALL
2. NOT BEING ABLE TO STOP OR CONTROL WORRYING: NOT AT ALL
3. WORRYING TOO MUCH ABOUT DIFFERENT THINGS: NOT AT ALL

## 2019-05-10 ASSESSMENT — PATIENT HEALTH QUESTIONNAIRE - PHQ9
SUM OF ALL RESPONSES TO PHQ QUESTIONS 1-9: 0
5. POOR APPETITE OR OVEREATING: NOT AT ALL

## 2019-05-10 ASSESSMENT — MIFFLIN-ST. JEOR: SCORE: 1370.86

## 2019-05-10 NOTE — TELEPHONE ENCOUNTER
Patient reports she is having a bit of blood in her urine, not a lot. She is sure she has a UTI, has an appointment today in clinic.  She denies inability to urinate, large amount of blood, fever, severe discomfort. She is not on blood thinners.  I instructed that it is fine to wait until her appointment.  Keep hydrating with water and cranberry juice.  She will do so and will call back with any worsening sx in the meantime.     Jyoti Browning RN

## 2019-05-10 NOTE — PROGRESS NOTES
SUBJECTIVE:   Patricia A Sowada is a 74 year old female who presents to clinic today for the following   health issues:      URINARY TRACT SYMPTOMS  Onset: since this morning     Description:   Painful urination (Dysuria): no   Blood in urine (Hematuria): YES  Delay in urine (Hesitency): no     Intensity: moderate    Progression of Symptoms:  improving and no blood in the urine     Accompanying Signs & Symptoms:  Fever/chills: no   Flank pain no   Nausea and vomiting: no   Any vaginal symptoms: none  Abdominal/Pelvic Pain: no     History:   History of frequent UTI's: no   History of kidney stones: no   Sexually Active: no   Possibility of pregnancy: No    Precipitating factors:       Therapies Tried and outcome: Cranberry juice prn (contraindicated in Coumadin patients)        Additional history: as documented    Reviewed  and updated as needed this visit by clinical staff  Allergies  Meds         Reviewed and updated as needed this visit by Provider         Patient Active Problem List   Diagnosis     Impaired fasting glucose     Overweight     CARDIOVASCULAR SCREENING; LDL GOAL LESS THAN 130     Hypertension goal BP (blood pressure) < 140/90     Seborrheic keratosis     Advanced directives, counseling/discussion     Colon polyps     Morning joint stiffness     Diverticular disease of colon     Uterine leiomyoma, unspecified location     Acute cholecystitis     Past Surgical History:   Procedure Laterality Date     BUNIONECTOMY RT/LT  6/2001    right     C NONSPECIFIC PROCEDURE  1996    hammer toe repair, bilat     COLONOSCOPY N/A 10/9/2015    Procedure: COLONOSCOPY;  Surgeon: Aldo Hurtado MD;  Location: [facility]     TONSILLECTOMY  1950     TUBAL LIGATION  1980       Social History     Tobacco Use     Smoking status: Never Smoker     Smokeless tobacco: Never Used   Substance Use Topics     Alcohol use: No     Family History   Problem Relation Age of Onset     Cancer Father         lymphoma      "Heart Disease Mother         valve replacement     Breast Cancer Sister         52 y/o     Hypertension Brother      Hypertension Brother      Diabetes No family hx of            ROS:  Constitutional, HEENT, cardiovascular, pulmonary, GI, , musculoskeletal, neuro, skin, endocrine and psych systems are negative, except as otherwise noted.    OBJECTIVE:     /84   Pulse 72   Temp 97.8  F (36.6  C) (Oral)   Ht 1.702 m (5' 7\")   Wt 84.8 kg (187 lb)   SpO2 98%   BMI 29.29 kg/m    Body mass index is 29.29 kg/m .  GENERAL: healthy, alert and no distress  EYES: Eyes grossly normal to inspection, PERRL and conjunctivae and sclerae normal  NECK: no adenopathy, no asymmetry, masses, or scars and thyroid normal to palpation  RESP: lungs clear to auscultation - no rales, rhonchi or wheezes  CV: regular rate and rhythm, normal S1 S2, no S3 or S4, no murmur, click or rub, no peripheral edema and peripheral pulses strong  ABDOMEN: soft, nontender, no hepatosplenomegaly, no masses and bowel sounds normal  MS: no gross musculoskeletal defects noted, no edema    Diagnostic Test Results:  Results for orders placed or performed in visit on 05/10/19 (from the past 24 hour(s))   UA reflex to Microscopic and Culture   Result Value Ref Range    Color Urine Yellow     Appearance Urine Slightly Cloudy     Glucose Urine Negative NEG^Negative mg/dL    Bilirubin Urine Negative NEG^Negative    Ketones Urine Negative NEG^Negative mg/dL    Specific Gravity Urine 1.015 1.003 - 1.035    Blood Urine Large (A) NEG^Negative    pH Urine 7.5 (H) 5.0 - 7.0 pH    Protein Albumin Urine Negative NEG^Negative mg/dL    Urobilinogen Urine 0.2 0.2 - 1.0 EU/dL    Nitrite Urine Positive (A) NEG^Negative    Leukocyte Esterase Urine Large (A) NEG^Negative    Source Midstream Urine    Urine Microscopic   Result Value Ref Range    WBC Urine  (A) OTO5^0 - 5 /HPF    RBC Urine  (A) OTO2^O - 2 /HPF    WBC Clumps Present (A) NEG^Negative /HPF    " Squamous Epithelial /LPF Urine Few FEW^Few /LPF    Transitional Epi Few FEW^Few /HPF    Bacteria Urine Many (A) NEG^Negative /HPF       ASSESSMENT/PLAN:     1. Acute cystitis with hematuria  + leukocytes, nitrates, WBC  and RBC   - sulfamethoxazole-trimethoprim (BACTRIM DS/SEPTRA DS) 800-160 MG tablet; Take 1 tablet by mouth 2 times daily for 7 days  Dispense: 14 tablet; Refill: 0    CARL Sanford [facility]

## 2019-05-10 NOTE — TELEPHONE ENCOUNTER
Reason for call:  Patient reporting a symptom    Symptom or request: possible bladder infection     Duration (how long have symptoms been present): 1 day     Have you been treated for this before? No    Additional comments: has an appointment today at 140 pm wants to know what she should be in the mean time     Phone Number patient can be reached at:  Home number on file [phone] (home)    Best Time:  any    Can we leave a detailed message on this number:  YES    Call taken on 5/10/2019 at 7:18 AM by Norah Lay

## 2019-05-11 ASSESSMENT — ANXIETY QUESTIONNAIRES: GAD7 TOTAL SCORE: 0

## 2019-05-12 LAB
BACTERIA SPEC CULT: ABNORMAL
SPECIMEN SOURCE: ABNORMAL

## 2019-05-14 ENCOUNTER — TELEPHONE (OUTPATIENT)
Dept: FAMILY MEDICINE | Facility: CLINIC | Age: 74
End: 2019-05-14

## 2019-05-14 NOTE — TELEPHONE ENCOUNTER
Spoke to patient she is feeling better symptoms resolved she will continue with abx until its gone.  Debi Diehl,Clinic Rn  [place]

## 2019-06-25 ENCOUNTER — TRANSFERRED RECORDS (OUTPATIENT)
Dept: HEALTH INFORMATION MANAGEMENT | Facility: CLINIC | Age: 74
End: 2019-06-25

## 2019-06-26 ENCOUNTER — NURSE TRIAGE (OUTPATIENT)
Dept: FAMILY MEDICINE | Facility: CLINIC | Age: 74
End: 2019-06-26

## 2019-06-26 ENCOUNTER — TRANSFERRED RECORDS (OUTPATIENT)
Dept: HEALTH INFORMATION MANAGEMENT | Facility: CLINIC | Age: 74
End: 2019-06-26

## 2019-06-26 NOTE — TELEPHONE ENCOUNTER
Reason for call:  Patient reporting a symptom    Symptom or request: Bladder infection    Duration (how long have symptoms been present): Last hour    Have you been treated for this before? Yes    Additional comments: Patient's mom called and stated for the past hour she has been having the same symptoms she had when she had a bladder infection back in May.  Patient also stated she would like to know if it would be possible to be prescribed the same medication she got before to treat symptoms.  Patient also stated she would like to know if this is something she could [facility].      Phone Number patient can be reached at:  Home number on file [phone] (home)    Best Time:  Anytime    Can we leave a detailed message on this number:  YES    Call taken on 6/26/2019 at 1:41 PM by Colleen Tomas

## 2019-06-26 NOTE — TELEPHONE ENCOUNTER
"Patient will be seen at local [facility] clinic. She will call to cancel appt for tomorrow at [facility] if able to be seen at [facility].    Darci Teran RN      Additional Information    Negative: Shock suspected (e.g., cold/pale/clammy skin, too weak to stand, low BP, rapid pulse)    Negative: Sounds like a life-threatening emergency to the triager    Negative: Urinary catheter, questions about    Negative: Recent back or abdominal injury    Negative: Recent genital injury    Negative: Unable to urinate (or only a few drops) > 4 hours and bladder feels very full (e.g., palpable bladder or strong urge to urinate)    Negative: Passing pure blood or large blood clots (i.e., larger than a dime or grape)    Negative: Fever > 100.5 F (38.1 C)    Negative: Patient sounds very sick or weak to the triager    Negative: Known sickle cell disease    Negative: Taking Coumadin (warfarin) or other strong blood thinner, or known bleeding disorder (e.g., thrombocytopenia)    Negative: Side (flank) or back pain present    Negative: Pain or burning with passing urine (urination)    All other patients with blood in urine (Exception: could be normal menstrual bleeding)    Negative: Pink or red-colored urine and likely from food (beets, rhubarb, red food dye) and lasts > 24 hours after stopping food    Patient wants to be seen    Answer Assessment - Initial Assessment Questions  1. COLOR of URINE: \"Describe the color of the urine.\"  (e.g., tea-colored, pink, red, blood clots, bloody)      trace  2. ONSET: \"When did the bleeding start?\"       Once earlier today  3. EPISODES: \"How many times has there been blood in the urine?\" or \"How many times today?\"      one  4. PAIN with URINATION: \"Is there any pain with passing your urine?\" If so, ask: \"How bad is the pain?\"  (Scale 1-10; or mild, moderate, severe)     - MILD - complains slightly about urination hurting     - MODERATE - interferes with normal activities       - SEVERE - " "excruciating, unwilling or unable to urinate because of the pain       none  5. FEVER: \"Do you have a fever?\" If so, ask: \"What is your temperature, how was it measured, and when did it start?\"      none  6. ASSOCIATED SYMPTOMS: \"Are you passing urine more frequently than usual?\"      Yes  7. OTHER SYMPTOMS: \"Do you have any other symptoms?\" (e.g., back/flank pain, abdominal pain, vomiting)      Dark yellow urine  8. PREGNANCY: \"Is there any chance you are pregnant?\" \"When was your last menstrual period?\"      No    Protocols used: URINE - BLOOD IN-A-OH    "

## 2019-07-29 DIAGNOSIS — I10 HYPERTENSION GOAL BP (BLOOD PRESSURE) < 140/90: ICD-10-CM

## 2019-07-31 RX ORDER — HYDROCHLOROTHIAZIDE 12.5 MG/1
CAPSULE ORAL
Qty: 30 CAPSULE | Refills: 0 | Status: SHIPPED | OUTPATIENT
Start: 2019-07-31 | End: 2019-08-25

## 2019-07-31 NOTE — TELEPHONE ENCOUNTER
Patient notified that due for visit and labs, including lipid profile. Offered to assist with scheduling, but patient states she'll need to check her calendar and call back. She will schedule a wellness/physical for the morning and come fasting. One month cindy refill sent with note to pharmacy.    Jyoti Browning RN

## 2019-08-25 ENCOUNTER — TELEPHONE (OUTPATIENT)
Dept: FAMILY MEDICINE | Facility: CLINIC | Age: 74
End: 2019-08-25

## 2019-08-25 DIAGNOSIS — I10 HYPERTENSION GOAL BP (BLOOD PRESSURE) < 140/90: ICD-10-CM

## 2019-08-26 NOTE — TELEPHONE ENCOUNTER
"Requested Prescriptions   Pending Prescriptions Disp Refills     hydrochlorothiazide (MICROZIDE) 12.5 MG capsule [Pharmacy Med Name: HYDROCHLOROTHIAZIDE 12.5 MG CP]  Last Written Prescription Date:  7/31/2019  Last Fill Quantity: 30 capsule,  # refills: 0   Last office visit: 8/20/2018 with prescribing provider:  YEVGENIY Medina   Future Office Visit:     30 capsule 0     Sig: TAKE 1 CAPSULE BY MOUTH EVERY DAY       Diuretics (Including Combos) Protocol Failed - 8/25/2019  8:32 AM        Failed - Blood pressure under 140/90 in past 12 months     BP Readings from Last 3 Encounters:   05/10/19 142/84   01/16/19 145/85   10/10/18 137/78           Failed - Normal serum creatinine on file in past 12 months     Recent Labs   Lab Test 08/08/18  0952   CR 0.66              Failed - Normal serum potassium on file in past 12 months     Recent Labs   Lab Test 08/08/18  0952   POTASSIUM 5.0              Failed - Normal serum sodium on file in past 12 months     Recent Labs   Lab Test 08/08/18  0952                 Passed - Recent (12 mo) or future (30 days) visit within the authorizing provider's specialty     Patient had office visit in the last 12 months or has a visit in the next 30 days with authorizing provider or within the authorizing provider's specialty.  See \"Patient Info\" tab in inbasket, or \"Choose Columns\" in Meds & Orders section of the refill encounter.              Passed - Medication is active on med list        Passed - Patient is age 18 or older        Passed - No active pregancy on record        Passed - No positive pregnancy test in past 12 months          "

## 2019-08-27 ENCOUNTER — TRANSFERRED RECORDS (OUTPATIENT)
Dept: HEALTH INFORMATION MANAGEMENT | Facility: CLINIC | Age: 74
End: 2019-08-27

## 2019-08-27 NOTE — TELEPHONE ENCOUNTER
Left a message on patient's answering machine explaining that last month we gave her a month refill to give her time to schedule an appointment with Dr. Medina. This was not done. Explained to her that once she makes that appointment we can fill her medication to get her to her appointment.  Ellie Cruz RN

## 2019-09-11 RX ORDER — HYDROCHLOROTHIAZIDE 12.5 MG/1
CAPSULE ORAL
Qty: 30 CAPSULE | Refills: 0 | Status: SHIPPED | OUTPATIENT
Start: 2019-09-11 | End: 2019-09-24

## 2019-09-11 RX ORDER — HYDROCHLOROTHIAZIDE 12.5 MG/1
CAPSULE ORAL
Qty: 30 CAPSULE | Refills: 0 | Status: SHIPPED | OUTPATIENT
Start: 2019-09-11 | End: 2019-09-11

## 2019-09-11 NOTE — TELEPHONE ENCOUNTER
Patient called and stated her pharmacy still has not received this prescription. She stated she does not have enough to last her until her appointment on 9/24/19. She asked if someone from the care team could call her back to discuss this.     Thank you,  Claudine Riggs  Patient Representative

## 2019-09-11 NOTE — TELEPHONE ENCOUNTER
Cathi refill given.    Patient has upcoming appointment on 9/24.      Patient aware refill was sent.    Cristhian Burns RN

## 2019-09-24 ENCOUNTER — OFFICE VISIT (OUTPATIENT)
Dept: FAMILY MEDICINE | Facility: CLINIC | Age: 74
End: 2019-09-24
Payer: MEDICARE

## 2019-09-24 VITALS
TEMPERATURE: 97.7 F | HEIGHT: 67 IN | BODY MASS INDEX: 28.72 KG/M2 | HEART RATE: 65 BPM | DIASTOLIC BLOOD PRESSURE: 86 MMHG | WEIGHT: 183 LBS | OXYGEN SATURATION: 99 % | SYSTOLIC BLOOD PRESSURE: 138 MMHG

## 2019-09-24 DIAGNOSIS — Z13.220 LIPID SCREENING: ICD-10-CM

## 2019-09-24 DIAGNOSIS — K57.30 DIVERTICULAR DISEASE OF COLON: ICD-10-CM

## 2019-09-24 DIAGNOSIS — Z00.00 ENCOUNTER FOR MEDICARE ANNUAL WELLNESS EXAM: Primary | ICD-10-CM

## 2019-09-24 DIAGNOSIS — I47.10 PAROXYSMAL SUPRAVENTRICULAR TACHYCARDIA (H): ICD-10-CM

## 2019-09-24 DIAGNOSIS — H91.93 HEARING PROBLEM OF BOTH EARS: ICD-10-CM

## 2019-09-24 DIAGNOSIS — I47.10 SVT (SUPRAVENTRICULAR TACHYCARDIA) (H): ICD-10-CM

## 2019-09-24 DIAGNOSIS — I10 HYPERTENSION GOAL BP (BLOOD PRESSURE) < 140/90: ICD-10-CM

## 2019-09-24 LAB
BASOPHILS # BLD AUTO: 0.1 10E9/L (ref 0–0.2)
BASOPHILS NFR BLD AUTO: 1.1 %
DIFFERENTIAL METHOD BLD: NORMAL
EOSINOPHIL # BLD AUTO: 0.3 10E9/L (ref 0–0.7)
EOSINOPHIL NFR BLD AUTO: 2.8 %
ERYTHROCYTE [DISTWIDTH] IN BLOOD BY AUTOMATED COUNT: 13.8 % (ref 10–15)
HCT VFR BLD AUTO: 44 % (ref 35–47)
HGB BLD-MCNC: 15 G/DL (ref 11.7–15.7)
LYMPHOCYTES # BLD AUTO: 1.8 10E9/L (ref 0.8–5.3)
LYMPHOCYTES NFR BLD AUTO: 19 %
MCH RBC QN AUTO: 30 PG (ref 26.5–33)
MCHC RBC AUTO-ENTMCNC: 34.1 G/DL (ref 31.5–36.5)
MCV RBC AUTO: 88 FL (ref 78–100)
MONOCYTES # BLD AUTO: 0.9 10E9/L (ref 0–1.3)
MONOCYTES NFR BLD AUTO: 9.1 %
NEUTROPHILS # BLD AUTO: 6.3 10E9/L (ref 1.6–8.3)
NEUTROPHILS NFR BLD AUTO: 68 %
PLATELET # BLD AUTO: 278 10E9/L (ref 150–450)
RBC # BLD AUTO: 5 10E12/L (ref 3.8–5.2)
WBC # BLD AUTO: 9.3 10E9/L (ref 4–11)

## 2019-09-24 PROCEDURE — 80061 LIPID PANEL: CPT | Performed by: FAMILY MEDICINE

## 2019-09-24 PROCEDURE — G0439 PPPS, SUBSEQ VISIT: HCPCS | Performed by: FAMILY MEDICINE

## 2019-09-24 PROCEDURE — 85025 COMPLETE CBC W/AUTO DIFF WBC: CPT | Performed by: FAMILY MEDICINE

## 2019-09-24 PROCEDURE — 99213 OFFICE O/P EST LOW 20 MIN: CPT | Mod: 25 | Performed by: FAMILY MEDICINE

## 2019-09-24 PROCEDURE — 36415 COLL VENOUS BLD VENIPUNCTURE: CPT | Performed by: FAMILY MEDICINE

## 2019-09-24 PROCEDURE — 80048 BASIC METABOLIC PNL TOTAL CA: CPT | Performed by: FAMILY MEDICINE

## 2019-09-24 RX ORDER — HYDROCHLOROTHIAZIDE 12.5 MG/1
CAPSULE ORAL
Qty: 90 CAPSULE | Refills: 1 | Status: SHIPPED | OUTPATIENT
Start: 2019-09-24 | End: 2020-04-21

## 2019-09-24 RX ORDER — METOPROLOL SUCCINATE 100 MG/1
100 TABLET, EXTENDED RELEASE ORAL DAILY
Qty: 90 TABLET | Refills: 3 | Status: SHIPPED | OUTPATIENT
Start: 2019-09-24

## 2019-09-24 ASSESSMENT — ENCOUNTER SYMPTOMS
SORE THROAT: 0
PARESTHESIAS: 0
HEADACHES: 0
WEAKNESS: 0
DIARRHEA: 0
CHILLS: 0
HEARTBURN: 0
NAUSEA: 0
NERVOUS/ANXIOUS: 0
COUGH: 0
EYE PAIN: 0
FEVER: 0
ARTHRALGIAS: 0
BREAST MASS: 0
DIZZINESS: 0
HEMATURIA: 0
HEMATOCHEZIA: 0
CONSTIPATION: 0
DYSURIA: 0
SHORTNESS OF BREATH: 0
PALPITATIONS: 0
FREQUENCY: 0
ABDOMINAL PAIN: 0
JOINT SWELLING: 0
MYALGIAS: 0

## 2019-09-24 ASSESSMENT — ACTIVITIES OF DAILY LIVING (ADL): CURRENT_FUNCTION: NO ASSISTANCE NEEDED

## 2019-09-24 ASSESSMENT — MIFFLIN-ST. JEOR: SCORE: 1352.71

## 2019-09-24 NOTE — PROGRESS NOTES
"SUBJECTIVE:   Patricia A Sowada is a 74 year old female who presents for Preventive Visit.    Are you in the first 12 months of your Medicare coverage?  No    Healthy Habits:     In general, how would you rate your overall health?  Excellent    Frequency of exercise:  6-7 days/week    Duration of exercise:  15-30 minutes    Do you usually eat at least 4 servings of fruit and vegetables a day, include whole grains    & fiber and avoid regularly eating high fat or \"junk\" foods?  Yes    Taking medications regularly:  Yes    Medication side effects:  None    Ability to successfully perform activities of daily living:  No assistance needed    Home Safety:  No safety concerns identified    Hearing Impairment:  Difficulty understanding soft or whispered speech    In the past 6 months, have you been bothered by leaking of urine?  No    In general, how would you rate your overall mental or emotional health?  Excellent      PHQ-2 Total Score: 0    Additional concerns today:  No       Do you feel safe in your environment? Yes    Do you have a Health Care Directive? Yes: Advance Directive has been received and scanned.    Patient is reporting of not hearing well, to little kids    Hearing Assessment: normal  Fall risk  Fallen 2 or more times in the past year?: No  Any fall with injury in the past year?: No    Cognitive Screening   1) Repeat 3 items (Leader, Season, Table)    2) Clock draw: NORMAL  3) 3 item recall: Recalls 3 objects  Results: 3 items recalled: COGNITIVE IMPAIRMENT LESS LIKELY    Mini-CogTM Copyright S Jatinder. Licensed by the author for use in [facility]; reprinted with permission ([email or web address]@.[email or web address]). All rights reserved.      Do you have sleep apnea, excessive snoring or daytime drowsiness?: no    Reviewed and updated as needed this visit by clinical staff         Reviewed and updated as needed this visit by Provider        Social History     Tobacco Use     Smoking status: Never Smoker     Smokeless " tobacco: Never Used   Substance Use Topics     Alcohol use: No     If you drink alcohol do you typically have >3 drinks per day or >7 drinks per week? Not applicable    Alcohol Use 9/24/2019   Prescreen: >3 drinks/day or >7 drinks/week? No   Prescreen: >3 drinks/day or >7 drinks/week? -   No flowsheet data found.        Hypertension Follow-up      Do you check your blood pressure regularly outside of the clinic? No does have a bp machine but does not check it    Are you following a low salt diet? Yes    Are your blood pressures ever more than 140 on the top number (systolic) OR more   than 90 on the bottom number (diastolic), for example 140/90? unknown     Current providers sharing in care for this patient include:     Patient Care Team:  Trae Medina DO as PCP - General (Family Practice)  Olga Reyes MD as MD (Surgery)  Mikaela Miguel APRN CNP as Assigned PCP    The following health maintenance items are reviewed in Epic and correct as of today:  Health Maintenance   Topic Date Due     DEPRESSION ACTION PLAN  1945     MEDICARE ANNUAL WELLNESS VISIT  11/10/2017     COLONOSCOPY  10/09/2018     LIPID  02/09/2019     FALL RISK ASSESSMENT  02/09/2019     BMP  08/08/2019     INFLUENZA VACCINE (1) 09/01/2019     PHQ-9  11/10/2019     MAMMO SCREENING  08/27/2020     ADVANCE CARE PLANNING  11/10/2021     DTAP/TDAP/TD IMMUNIZATION (3 - Td) 02/09/2028     DEXA  Completed     PNEUMOCOCCAL IMMUNIZATION 65+ LOW/MEDIUM RISK  Completed     ZOSTER IMMUNIZATION  Completed     IPV IMMUNIZATION  Aged Out     MENINGITIS IMMUNIZATION  Aged Out     Lab work is in process  Labs reviewed in EPIC  BP Readings from Last 3 Encounters:   09/24/19 (!) 140/84   05/10/19 142/84   01/16/19 145/85    Wt Readings from Last 3 Encounters:   09/24/19 83 kg (183 lb)   05/10/19 84.8 kg (187 lb)   01/16/19 79.8 kg (176 lb)                  Pneumonia Vaccine:Adults age 65+ who received Pneumovax (PPSV23) at  "65 years or older: Should be given PCV13 > 1 year after their most recent PPSV23  Mammogram Screening: Patient over age 75, has elected to continue with mammography screening.  History of abnormal Pap smear: YES - updated in Problem List and Health Maintenance accordingly    Review of Systems   Constitutional: Negative for chills and fever.   HENT: Negative for congestion, ear pain, hearing loss and sore throat.    Eyes: Negative for pain and visual disturbance.   Respiratory: Negative for cough and shortness of breath.    Cardiovascular: Negative for chest pain, palpitations and peripheral edema.   Gastrointestinal: Negative for abdominal pain, constipation, diarrhea, heartburn, hematochezia and nausea.   Breasts:  Negative for tenderness, breast mass and discharge.   Genitourinary: Negative for dysuria, frequency, genital sores, hematuria, pelvic pain, urgency, vaginal bleeding and vaginal discharge.   Musculoskeletal: Negative for arthralgias, joint swelling and myalgias.   Skin: Negative for rash.   Neurological: Negative for dizziness, weakness, headaches and paresthesias.   Psychiatric/Behavioral: Negative for mood changes. The patient is not nervous/anxious.      Constitutional, HEENT, cardiovascular, pulmonary, GI, , musculoskeletal, neuro, skin, endocrine and psych systems are negative, except as otherwise noted.    OBJECTIVE:   There were no vitals taken for this visit. Estimated body mass index is 29.29 kg/m  as calculated from the following:    Height as of 5/10/19: 1.702 m (5' 7\").    Weight as of 5/10/19: 84.8 kg (187 lb).  Physical Exam  GENERAL: healthy, alert and no distress  EYES: Eyes grossly normal to inspection, PERRL and conjunctivae and sclerae normal  HENT: ear canals and TM's normal, nose and mouth without ulcers or lesions  NECK: no adenopathy, no asymmetry, masses, or scars and thyroid normal to palpation  RESP: lungs clear to auscultation - no rales, rhonchi or wheezes  BREAST: normal " "without masses, tenderness or nipple discharge and no palpable axillary masses or adenopathy  CV: regular rate and rhythm, normal S1 S2, no S3 or S4, no murmur, click or rub, no peripheral edema and peripheral pulses strong  ABDOMEN: soft, nontender, no hepatosplenomegaly, no masses and bowel sounds normal  MS: no gross musculoskeletal defects noted, no edema  SKIN: no suspicious lesions or rashes  NEURO: Normal strength and tone, mentation intact and speech normal  PSYCH: mentation appears normal, affect normal/bright    Diagnostic Test Results:  Labs reviewed in Epic    ASSESSMENT / PLAN:       ICD-10-CM    1. Encounter for Medicare annual wellness exam Z00.00 CBC with platelets and differential   2. SVT (supraventricular tachycardia) (H) I47.1 CBC with platelets and differential   3. Diverticular disease of colon K57.30    4. Hypertension goal BP (blood pressure) < 140/90 I10 BASIC METABOLIC PANEL     hydrochlorothiazide (MICROZIDE) 12.5 MG capsule   5. Lipid screening Z13.220 Lipid panel reflex to direct LDL Fasting   6. Hearing problem of both ears H91.93 AUDIOLOGY ADULT REFERRAL   7. Paroxysmal supraventricular tachycardia (H) I47.1 metoprolol succinate ER (TOPROL-XL) 100 MG 24 hr tablet   svt-history of , stable, seen cards, cont BB, no symptoms  hypertension-stable on hydrochlorithiazide-labs today, cont meds  Hard time hearing-Audiology follow up for hearing test  Continue all meds  Flu shot as advise  Colon cancer screening -she does not want colonoscopy anymore, prefers cologaurd next year  History of diverticular dz-no issues  End of Life Planning:  Patient currently has an advanced directive: Yes.  Practitioner is supportive of decision.    COUNSELING:  Reviewed preventive health counseling, as reflected in patient instructions    Estimated body mass index is 29.29 kg/m  as calculated from the following:    Height as of 5/10/19: 1.702 m (5' 7\").    Weight as of 5/10/19: 84.8 kg (187 lb).         reports " that she has never smoked. She has never used smokeless tobacco.      Appropriate preventive services were discussed with this patient, including applicable screening as appropriate for cardiovascular disease, diabetes, osteopenia/osteoporosis, and glaucoma.  As appropriate for age/gender, discussed screening for colorectal cancer, prostate cancer, breast cancer, and cervical cancer. Checklist reviewing preventive services available has been given to the patient.    Reviewed patients plan of care and provided an AVS. The Basic Care Plan (routine screening as documented in Health Maintenance) for Myla meets the Care Plan requirement. This Care Plan has been established and reviewed with the Patient.    Counseling Resources:  ATP IV Guidelines  Pooled Cohorts Equation Calculator  Breast Cancer Risk Calculator  FRAX Risk Assessment  ICSI Preventive Guidelines  Dietary Guidelines for Americans, 2010  USDA's MyPlate  ASA Prophylaxis  Lung CA Screening    Trae Medina DO  [facility]    Identified Health Risks:

## 2019-09-24 NOTE — PATIENT INSTRUCTIONS
Audiology follow up for hearing test  Continue all meds  Flu shot as advise    Patient Education   Personalized Prevention Plan  You are due for the preventive services outlined below.  Your care team is available to assist you in scheduling these services.  If you have already completed any of these items, please share that information with your care team to update in your medical record.  Health Maintenance Due   Topic Date Due     Depression Action Plan  1945     Annual Wellness Visit  11/10/2017     Cholesterol Lab  02/09/2019     FALL RISK ASSESSMENT  02/09/2019     Basic Metabolic Panel  08/08/2019     Flu Vaccine (1) 09/01/2019

## 2019-09-24 NOTE — LETTER
"[facility]  [address] [place] [id number] [phone]                                                                                                September 30, 2019    Myla STANLEY Juan Manuelshlomo  [facility] [facility] [id number]        Your cholesterol is abnormal, your sugar is high(follow up for DIABETES MELLITUS test asap for that) please use the recommendations below and recheck labs in 6 months.     Ways to improve your cholesterol...     1- Eats less saturated fats (including avoiding \"trans\" fats).     2 - Eat more unsaturated fats  - found in vegetables, grains, and tree nuts.   Also by replacing butter with canola oil or olive oil.     3 - Eat more nuts. 1-2 ounces (a small handful) of almonds, walnuts, hazelnuts or pecans once a day in place of other less healthy snacks.     4 - Eat more high fiber foods - vegetables and whole grains including oat bran, oats, beans, peas, and flax seed.     5 - Eat more fish - such as salmon, tuna, mackerel, and sardines.  1 or 2 six ounce servings per week is a healthy replacement for other proteins.     6 - Exercise for at least 120 minutes per week - which is equal to 30 minutes 4 days per week.     Trae Medina D.O./darby    Results for orders placed or performed in visit on 09/24/19   Lipid panel reflex to direct LDL Fasting   Result Value Ref Range    Cholesterol 182 <200 mg/dL    Triglycerides 79 <150 mg/dL    HDL Cholesterol 42 (L) >49 mg/dL    LDL Cholesterol Calculated 124 (H) <100 mg/dL    Non HDL Cholesterol 140 (H) <130 mg/dL   BASIC METABOLIC PANEL   Result Value Ref Range    Sodium 133 133 - 144 mmol/L    Potassium 4.9 3.4 - 5.3 mmol/L    Chloride 101 94 - 109 mmol/L    Carbon Dioxide 26 20 - 32 mmol/L    Anion Gap 6 3 - 14 mmol/L    Glucose 131 (H) 70 - 99 mg/dL    Urea Nitrogen 12 7 - 30 mg/dL    Creatinine 0.77 0.52 - 1.04 mg/dL    GFR Estimate 74 >60 mL/min/[1.73_m2]    GFR Estimate If Black 86 >60 " mL/min/[1.73_m2]    Calcium 9.1 8.5 - 10.1 mg/dL   CBC with platelets and differential   Result Value Ref Range    WBC 9.3 4.0 - 11.0 10e9/L    RBC Count 5.00 3.8 - 5.2 10e12/L    Hemoglobin 15.0 11.7 - 15.7 g/dL    Hematocrit 44.0 35.0 - 47.0 %    MCV 88 78 - 100 fl    MCH 30.0 26.5 - 33.0 pg    MCHC 34.1 31.5 - 36.5 g/dL    RDW 13.8 10.0 - 15.0 %    Platelet Count 278 150 - 450 10e9/L    % Neutrophils 68.0 %    % Lymphocytes 19.0 %    % Monocytes 9.1 %    % Eosinophils 2.8 %    % Basophils 1.1 %    Absolute Neutrophil 6.3 1.6 - 8.3 10e9/L    Absolute Lymphocytes 1.8 0.8 - 5.3 10e9/L    Absolute Monocytes 0.9 0.0 - 1.3 10e9/L    Absolute Eosinophils 0.3 0.0 - 0.7 10e9/L    Absolute Basophils 0.1 0.0 - 0.2 10e9/L    Diff Method Automated Method

## 2019-09-25 LAB
ANION GAP SERPL CALCULATED.3IONS-SCNC: 6 MMOL/L (ref 3–14)
BUN SERPL-MCNC: 12 MG/DL (ref 7–30)
CALCIUM SERPL-MCNC: 9.1 MG/DL (ref 8.5–10.1)
CHLORIDE SERPL-SCNC: 101 MMOL/L (ref 94–109)
CHOLEST SERPL-MCNC: 182 MG/DL
CO2 SERPL-SCNC: 26 MMOL/L (ref 20–32)
CREAT SERPL-MCNC: 0.77 MG/DL (ref 0.52–1.04)
GFR SERPL CREATININE-BSD FRML MDRD: 74 ML/MIN/{1.73_M2}
GLUCOSE SERPL-MCNC: 131 MG/DL (ref 70–99)
HDLC SERPL-MCNC: 42 MG/DL
LDLC SERPL CALC-MCNC: 124 MG/DL
NONHDLC SERPL-MCNC: 140 MG/DL
POTASSIUM SERPL-SCNC: 4.9 MMOL/L (ref 3.4–5.3)
SODIUM SERPL-SCNC: 133 MMOL/L (ref 133–144)
TRIGL SERPL-MCNC: 79 MG/DL

## 2019-09-30 NOTE — RESULT ENCOUNTER NOTE
"Your cholesterol is abnormal, your sugar is high(follow up for DIABETES MELLITUS test asap for that) please use the recommendations below and recheck labs in 6 months.    Ways to improve your cholesterol...    1- Eats less saturated fats (including avoiding \"trans\" fats).    2 - Eat more unsaturated fats  - found in vege  tables, grains, and tree nuts.   Also by replacing butter with canola oil or olive oil.    3 - Eat more nuts.   1-2 ounces (a small handful) of almonds, walnuts, hazelnuts or pecans once a  day in place of other less healthy snacks.    4 - Eat more high   fiber foods - vegetables and whole grains including oat bran, oats, beans, peas, and flax seed.    5 - Eat more fish - such as salmon, tuna, mackerel, and sardines.  1 or 2 six ounce servings per week is a healthy replacement for other proteins.    6 - E  xercise for at least 120 minutes per week - which is equal to 30 minutes 4 days per week.    Trae Medina D.O.    "

## 2019-10-14 ENCOUNTER — TELEPHONE (OUTPATIENT)
Dept: FAMILY MEDICINE | Facility: CLINIC | Age: 74
End: 2019-10-14

## 2019-10-14 DIAGNOSIS — R73.9 BLOOD GLUCOSE ELEVATED: Primary | ICD-10-CM

## 2019-10-14 NOTE — TELEPHONE ENCOUNTER
Reason for Call:  Other - Patient Question    Detailed comments: Patient received a letter stating she needed to schedule a diabetes mellitus test. Patient would like to know if she needs to see Dr. Medina for this. There are no lab orders in patient's chart. Please call patient back to clarify instructions    Phone Number Patient can be reached at: Home number on file [phone] (home)    Best Time: As soon as possible    Can we leave a detailed message on this number? YES    Call taken on 10/14/2019 at 7:47 AM by Claudine Riggs

## 2019-10-14 NOTE — TELEPHONE ENCOUNTER
Reason for Call:  Other /     Detailed comments: Patient called and stated she would like to speak to a nurse to discuss what she would need to do, if come in and see the doctor, if she would have to fast. Patient stated she does not know what to do and would like to clarify that. Patient also stated she will be out but will be available after 3:30 today.    Phone Number Patient can be reached at: Home number on file [phone] (home)    Best Time: After 3:30 today    Can we leave a detailed message on this number? YES    Call taken on 10/14/2019 at 3:03 PM by Colleen Tomas

## 2019-10-14 NOTE — TELEPHONE ENCOUNTER
"Per chart review, patient saw PCP on 9/24/19 for a Medicare annual wellness exam. Lipid panel, BMP and CBC done at that time with glucose level at 131 and recommendation for DM testing. Would you like her to return to clinic, or have A1C lab?  Your result note posted 9/29/19 is pasted below. A1C order pended for approval if appropriate.    Jyoti Browning RN    Notes recorded by Trae Medina DO on 9/29/2019 at 7:13 PM CDT  \"Your cholesterol is abnormal, your sugar is high(follow up for DIABETES MELLITUS test asap for that) please use the recommendations below and recheck labs in 6 months.\"  "

## 2019-10-14 NOTE — TELEPHONE ENCOUNTER
Left detailed message that lab was ordered.  Patient to make lab only appointment only.    Cristhian Burns RN

## 2019-10-15 DIAGNOSIS — R73.9 BLOOD GLUCOSE ELEVATED: ICD-10-CM

## 2019-10-15 LAB — HBA1C MFR BLD: 5.3 % (ref 0–5.6)

## 2019-10-15 PROCEDURE — 36415 COLL VENOUS BLD VENIPUNCTURE: CPT | Performed by: FAMILY MEDICINE

## 2019-10-15 PROCEDURE — 83036 HEMOGLOBIN GLYCOSYLATED A1C: CPT | Performed by: FAMILY MEDICINE

## 2019-10-15 NOTE — LETTER
[facility]  [address] [id number] [phone]                                                                                                October 21, 2019    Patricia A Sowada  [address] [id number]        Dear Ms. Sowada,    Your recent lab results were within normal limits. A copy of those results are included with this letter.        Sincerely,      Trae Medina, DO/hl    Results for orders placed or performed in visit on 10/15/19   **A1C FUTURE anytime   Result Value Ref Range    Hemoglobin A1C 5.3 0 - 5.6 %

## 2019-10-29 ENCOUNTER — OFFICE VISIT (OUTPATIENT)
Dept: AUDIOLOGY | Facility: CLINIC | Age: 74
End: 2019-10-29
Payer: MEDICARE

## 2019-10-29 DIAGNOSIS — H90.3 SENSORINEURAL HEARING LOSS, BILATERAL: Primary | ICD-10-CM

## 2019-10-29 PROCEDURE — 92557 COMPREHENSIVE HEARING TEST: CPT | Performed by: AUDIOLOGIST

## 2019-10-29 PROCEDURE — 92550 TYMPANOMETRY & REFLEX THRESH: CPT | Performed by: AUDIOLOGIST

## 2019-10-29 NOTE — PROGRESS NOTES
AUDIOLOGY REPORT    SUBJECTIVE:  Patricia A Sowada is a 74 year old female who was seen in the Audiology Clinic [facility] on 10/29/19 for audiologic evaluation, referred by Dr. Medina.  The patient reports a gradual decline in the ability to understand people, especially young girls. The patient denies  bilateral tinnitus, bilateral otalgia, bilateral drainage, bilateral aural fullness, family history of hearing loss, history of noise exposure, and dizziness. The patient notes difficulty with communication in a variety of listening situations. Patient was unaccompanied to today's visit.     Abuse Screening:  Do you feel unsafe at home or work/school? No  Do you feel threatened by someone? No  Does anyone try to keep you from having contact with others, or doing things outside of your home? No  Physical signs of abuse present? No     OBJECTIVE:    Otoscopic exam indicates ears are clear of cerumen bilaterally     Pure Tone Thresholds assessed using standard techniques  audiometry with good  reliability from 250-8000 Hz bilaterally using insert earphones and TDH headphones     RIGHT:  mild and severe sensorineural hearing loss    LEFT:    mild and severe sensorineural hearing loss    Tympanogram:    RIGHT: normal eardrum mobility    LEFT:   normal eardrum mobility    Reflexes (reported by stimulus ear): 1000 Hz  RIGHT: Ipsilateral is absent at frequencies tested  RIGHT: Contralateral is absent at frequencies tested  LEFT:   Ipsilateral is absent at frequencies tested  LEFT:   Contralateral is absent at frequencies tested    Speech Reception Threshold:    RIGHT: 50 dB HL    LEFT:   30 dB HL    Word Recognition Score:     RIGHT: 76% at 85 dB HL using NU-6 recorded word list.    LEFT:   80% at 85 dB HL using NU-6 recorded word list.    ASSESSMENT:   Bilateral sensorineural hearing loss      Today s results were discussed with the patient in detail.     PLAN:  Patient was counseled regarding hearing  loss and impact on communication.  Patient is a good candidate for amplification at this time.  Handout on good communication strategies, and hearing aid use was given to patient. It is recommended that the patient return for a hearing aid consultation.  Please call this clinic with questions regarding these results or recommendations.    Darien Nair [facility]  Licensed Audiologist #[id number]  10/29/2019    CC: Dr. Medina

## 2019-11-25 ENCOUNTER — OFFICE VISIT (OUTPATIENT)
Dept: FAMILY MEDICINE | Facility: CLINIC | Age: 74
End: 2019-11-25
Payer: MEDICARE

## 2019-11-25 VITALS
TEMPERATURE: 98.3 F | WEIGHT: 178 LBS | BODY MASS INDEX: 27.94 KG/M2 | SYSTOLIC BLOOD PRESSURE: 124 MMHG | HEART RATE: 72 BPM | DIASTOLIC BLOOD PRESSURE: 72 MMHG | HEIGHT: 67 IN

## 2019-11-25 DIAGNOSIS — B00.1 COLD SORE: Primary | ICD-10-CM

## 2019-11-25 PROCEDURE — 99000 SPECIMEN HANDLING OFFICE-LAB: CPT | Performed by: PHYSICIAN ASSISTANT

## 2019-11-25 PROCEDURE — 87252 VIRUS INOCULATION TISSUE: CPT | Mod: 90 | Performed by: PHYSICIAN ASSISTANT

## 2019-11-25 PROCEDURE — 99213 OFFICE O/P EST LOW 20 MIN: CPT | Performed by: PHYSICIAN ASSISTANT

## 2019-11-25 RX ORDER — ACYCLOVIR 400 MG/1
400 TABLET ORAL
COMMUNITY

## 2019-11-25 ASSESSMENT — MIFFLIN-ST. JEOR: SCORE: 1330.03

## 2019-11-25 NOTE — PROGRESS NOTES
"Subjective     Patricia A Sowada is a 74 year old female who presents to clinic today for the following health issues:    HPI   Rash  Onset: Friday night, had a problem like this about 15 years ago after sun exposure. She was seen at [facility] yesterday and brought reports.   They told her she has a cold sore and they are treating her for a cold sore and she wants to make sure that she does in fact have a cold sore.  She was told she had \"herpes\" 15 years ago and to \"not tell anyone.\"     Description:   Location: lip  Character: blisters  Itching (Pruritis): no, a little painful     Progression of Symptoms:  worsening    Accompanying Signs & Symptoms:  Fever: no   Body aches or joint pain: no   Sore throat symptoms: no   Recent cold symptoms: no     History:   Previous similar rash: YES, after sun exposure years ago     Precipitating factors:   Exposure to similar rash: no   New exposures: None   Recent travel: no       Patient Active Problem List   Diagnosis     Impaired fasting glucose     Overweight     CARDIOVASCULAR SCREENING; LDL GOAL LESS THAN 130     Hypertension goal BP (blood pressure) < 140/90     Seborrheic keratosis     Advanced directives, counseling/discussion     Colon polyps     Morning joint stiffness     Diverticular disease of colon     Uterine leiomyoma, unspecified location     Acute cholecystitis     Cold sore     Past Surgical History:   Procedure Laterality Date     BUNIONECTOMY RT/LT  6/2001    right     C NONSPECIFIC PROCEDURE  1996    hammer toe repair, bilat     COLONOSCOPY N/A 10/9/2015    Procedure: COLONOSCOPY;  Surgeon: Aldo Hurtado MD;  Location:  GI     TONSILLECTOMY  1950     TUBAL LIGATION  1980       Social History     Tobacco Use     Smoking status: Never Smoker     Smokeless tobacco: Never Used   Substance Use Topics     Alcohol use: No     Family History   Problem Relation Age of Onset     Cancer Father         lymphoma     Heart Disease Mother         " "valve replacement     Breast Cancer Sister         52 y/o     Hypertension Brother      Hypertension Brother      Diabetes No family hx of            Reviewed and updated as needed this visit by Provider         Review of Systems   ROS COMP: Constitutional, HEENT, cardiovascular, pulmonary, gi and gu systems are negative, except as otherwise noted.      Objective    /72   Pulse 72   Temp 98.3  F (36.8  C) (Oral)   Ht 1.702 m (5' 7\")   Wt 80.7 kg (178 lb)   BMI 27.88 kg/m    Body mass index is 27.88 kg/m .  Physical Exam   GENERAL: healthy, alert and no distress  Right lip, lower - she has vesicular lesions on an erythematous base     Diagnostic Test Results:  Labs reviewed in Epic        Assessment & Plan     (B00.1) Cold sore  (primary encounter diagnosis)  Comment:   Plan: Viral Culture Non-respiratory          I reassured her that yes, she has a cold sore. Yes, the treatment she is on is okay. I will run a confirmatory viral culture to reassure her. I spent a good amount of time explaining that \"Cold Sores\" are not in fact caused by colds and ARE in fact a herpes virus.        Fco Anand, MPAS, PA-C   "

## 2019-11-25 NOTE — PATIENT INSTRUCTIONS
[facility]   Discharged by : Bharati Palacios MA    Paper scripts provided to patient : no   If you have any questions regarding to your visit please contact your care team:     Team Silver              Clinic Hours Telephone Number     Dr. Gillian Anand PA-C     7am-7pm  Monday - Thursday   7am-5pm  Fridays  [phone]   (Appointment scheduling available 24/7)     RN Line  [phone] option 2     Urgent Care - [place] - 11am-9pm Monday-Friday Saturday-Sunday- 9am-5pm     [place] -   5pm-9pm Monday-Friday Saturday-Sunday- 9am-5pm    [phone] - [place]    [phone] - [place]     For a Price Quote for your services, please call our [employer] Price Line at [phone].     What options do I have for visits at the clinic other than the traditional office visit?     To expand how we care for you, many of our providers are utilizing electronic visits (e-visits) and telephone visits, when medically appropriate, for interactions with their patients rather than a visit in the clinic. We also offer nurse visits for many medical concerns. Just like any other service, we will bill your insurance company for this type of visit based on time spent on the phone with your provider. Not all insurance companies cover these visits. Please check with your medical insurance if this type of visit is covered. You will be responsible for any charges that are not paid by your insurance.   E-visits via [employer]HCare: generally incur a $45.00 fee.     Telephone visits:  Time spent on the phone: *charged based on time that is spent on the phone in increments of 10 minutes. Estimated cost:   5-10 mins $30.00   11-20 mins. $59.00   21-30 mins. $85.00       Use [employer] (secure email communication and access to your chart) to send your primary care provider a message or make an appointment. Ask someone on your Team how to sign up for H2HCare.   As always, Thank you  for trusting us with your health care needs!    [facility]:    Scheduling Appointments  [facility]  Call: [phone]    Ebony Álvarez, [facility]  Call: [phone]    [facility]  Call: [phone]    For Gastroenterology referrals   [facility]   Clinics and Surgery Center, 4th Floor   [address] [place] [address]   Appointments: [phone]    WHERE TO GO FOR CARE?  Clinic    Make an appointment if you:       Are sick (cold, cough, flu, sore throat, earache or in pain).       Have a small injury (sprain, small cut, burn or broken bone).       Need a physical exam, Pap smear, vaccine or prescription refill.       Have questions about your health or medicines.    To reach us:      Call [phone] ([phone]). Open 24 hours every day. (For counseling services, call [phone].)    Log into [employer] at Brief[employer]. (Visit [email or web address] to create an account.) Hospital emergency room    An emergency is a serious or life- threatening problem that must be treated right away.    Call 382 or get to the hospital if you have:      Very bad or sudden:            - Chest pain or pressure         - Bleeding         - Head or belly pain         - Dizziness or trouble seeing, walking or                          Speaking      Problems breathing      Blood in your vomit or you are coughing up blood      A major injury (knocked out, loss of a finger or limb, rape, broken bone protruding from skin)    A mental health crisis. (Or call the Mental Health Crisis line at 1-423.473.4545 or Suicide Prevention Hotline at 1-356.680.5760.)    Open 24 hours every day. You don't need an appointment.     Urgent care    Visit urgent care for sickness or small injuries when the clinic is closed. You don't need an appointment. To check hours or find an urgent care near you, visit [email or web address]. Online  care    Get online care from [facility] for more than 70 common problems, like colds, allergies and infections. Open 24 hours every day at:   [email or web address]   Need help deciding?    For advice about where to be seen, you may call your clinic and ask to speak with a nurse. We're here for you 24 hours every day.         If you are deaf or hard of hearing, please let us know. We provide many free services including sign language interpreters, oral interpreters, TTYs, telephone amplifiers, note takers and written materials.

## 2019-11-26 ENCOUNTER — OFFICE VISIT (OUTPATIENT)
Dept: AUDIOLOGY | Facility: CLINIC | Age: 74
End: 2019-11-26
Payer: MEDICARE

## 2019-11-26 DIAGNOSIS — H90.3 SENSORINEURAL HEARING LOSS, BILATERAL: Primary | ICD-10-CM

## 2019-11-26 PROCEDURE — 99207 ZZC NO CHARGE LOS: CPT | Performed by: AUDIOLOGIST

## 2019-11-26 PROCEDURE — V5275 EAR IMPRESSION: HCPCS | Mod: GY | Performed by: AUDIOLOGIST

## 2019-11-26 PROCEDURE — 92591 HC HEARING AID EXAM BINAURAL: CPT | Mod: GY | Performed by: AUDIOLOGIST

## 2019-11-26 NOTE — PROGRESS NOTES
AUDIOLOGY REPORT    SUBJECTIVE: Patricia A Sowada is a 74 year old female was seen in the Audiology Clinic at [facility] on 11/26/19 to discuss concerns with hearing and functional communication difficulties. Myla has been seen previously on 10/29/2019, and results revealed a bilateral sensorineural hearing loss. Patient was unaccompanied to today's visit.     OBJECTIVE:  Patient is a hearing aid candidate. Patient would like to move forward with a hearing aid evaluation today. Therefore, the patient was presented with different options for amplification to help aid in communication. Discussed styles, levels of technology and monaural vs. binaural fitting.     The hearing aid(s) mutually chosen were:  Binaural: ReSound Linx Quattro 7 R  COLOR: 4 dark Brown  BATTERY SIZE: Rechargeable  EARMOLD/TIPS: micromolds with canal lock  CANAL/ LENGTH: 2 MP    Otoscopy revealed ears are clear of cerumen bilaterally. Bilateral earmolds were taken without incident.    ASSESSMENT:   Bilateral sensorineural hearing loss      Reviewed purchase information and warranty information with patient. The 45 day trial period was explained to patient. The patient was given a copy of the [place] Department of Health consumer brochure on purchasing hearing instruments. Patient risk factors have been provided to the patient in writing prior to the sale of the hearing aid per FDA regulation. The risk factors are also available in the User Instructional Booklet to be presented on the day of the hearing aid fitting. Hearing aid(s) ordered. Hearing aid evaluation completed. Patient was advised to check with their insurance to ascertain of they are eligible for any hearing aid benefits.     PLAN: Myla is scheduled to return in 2-3 weeks for a hearing aid fitting and programming. Purchase agreement will be completed on that date. Please contact this clinic with any questions or concerns.      Darien Salazar  Au.D CCC-JADEN  Licensed Audiologist #[id number]  11/26/2019

## 2019-11-28 LAB
SPECIMEN SOURCE: ABNORMAL
VIRUS SPEC CULT: ABNORMAL
VIRUS SPEC CULT: ABNORMAL

## 2019-12-12 ENCOUNTER — TELEPHONE (OUTPATIENT)
Dept: FAMILY MEDICINE | Facility: CLINIC | Age: 74
End: 2019-12-12

## 2019-12-12 NOTE — TELEPHONE ENCOUNTER
Fco Anand PA-C  P Ne Team Silver             Team - can we please call the [facility] lab listed in the results and find out if they have a specific type of Herpes Virus that was cultured or if they don't have that level of information.   Thanks.   NAEEM Anderson PA-C

## 2019-12-12 NOTE — TELEPHONE ENCOUNTER
Tori, Team Stephen PULIDO called on Tuesday, 12/10. Ref# [id number], the test was only positive or negative, no specific virus cultured.    Kelechi Osorio

## 2019-12-17 ENCOUNTER — OFFICE VISIT (OUTPATIENT)
Dept: AUDIOLOGY | Facility: CLINIC | Age: 74
End: 2019-12-17
Payer: MEDICARE

## 2019-12-17 DIAGNOSIS — H90.3 BILATERAL SENSORINEURAL HEARING LOSS: Primary | ICD-10-CM

## 2019-12-17 DIAGNOSIS — H90.3 SENSORINEURAL HEARING LOSS, BILATERAL: Primary | ICD-10-CM

## 2019-12-17 PROCEDURE — 92593 HC HEARING AID CHECK, BINAURAL: CPT | Mod: GY | Performed by: AUDIOLOGIST

## 2019-12-17 PROCEDURE — 99207 ZZC NO CHARGE LOS: CPT | Performed by: AUDIOLOGIST

## 2019-12-17 PROCEDURE — V5020 CONFORMITY EVALUATION: HCPCS | Mod: GY | Performed by: AUDIOLOGIST

## 2019-12-17 PROCEDURE — V5011 HEARING AID FITTING/CHECKING: HCPCS | Mod: GY | Performed by: AUDIOLOGIST

## 2019-12-17 PROCEDURE — V5160 DISPENSING FEE BINAURAL: HCPCS | Mod: GY | Performed by: AUDIOLOGIST

## 2019-12-17 PROCEDURE — V5261 HEARING AID, DIGIT, BIN, BTE: HCPCS | Mod: GY | Performed by: AUDIOLOGIST

## 2019-12-17 PROCEDURE — V5264 EAR MOLD/INSERT: HCPCS | Mod: GY | Performed by: AUDIOLOGIST

## 2019-12-17 PROCEDURE — V5267 HEARING AID SUP/ACCESS/DEV: HCPCS | Performed by: AUDIOLOGIST

## 2019-12-17 NOTE — PROGRESS NOTES
Charges for hearing aid .     CHARGES:    Hearing aid     Darien Nair CCC-A  Licensed Audiologist #[id number]  12/17/2019

## 2019-12-17 NOTE — PROGRESS NOTES
AUDIOLOGY REPORT    SUBJECTIVE: Patricia Sowada, a 74 year old female, was seen in the Audiology Clinic at [facility] today for a Binaural hearing aid fitting. Previous results have revealed a bilateral sensorineural hearing loss. Patient was unaccompanied to today's visit.     OBJECTIVE:  Prior to fitting, a hearing aid check was performed to ensure device functionality. The hearing aid conformity evaluation was completed.The hearing aids were placed and they provided a good fit. Real-ear-probe-microphone measurements were completed on the [employer] system and were an acceptable match to NAL-NL2 target with soft sounds audible, moderate sounds comfortable, and loud sounds below discomfort. UCLs are verified through maximum power output measures and demonstrate appropriate limiting of loud inputs. Ms. Sowada was oriented to proper hearing aid use, care, cleaning (no water, dry brush), batteries (size rechargeable, insertion/removal, toxicity, low-battery signal), aid insertion/removal, user booklet, warranty information, storage cases, and other hearing aid details. The patient confirmed understanding of hearing aid use and care, and showed proper insertion of hearing aid and batteries while in the office today. Ms. Sowada reported good volume and sound quality today.    EAR(S) FIT: Binaural  MA HEARING AID MAKE: Right: ReSound ; Left: ReSound   MA HEARING AID MODEL #: Right: Linx Quattro 761; Left: Linx Quattro 761  HEARING AID STYLE: Right: RITE; Left: RITE  DOME SIZE: Right:  N/A; Left::  N/A   LENGTH: Right:  2 MP; Left:  2 MP  EARMOLDS: Right: Micromold with canal lock [id number]; Left:  Micromold with canal lock [id number]  SERIAL NUMBERS: Right: [id number]; Left: [id number]  WARRANTY END DATE: Right: 1/5/2023; Left:: 1/5/2023      CHARGES:   Earmold(s): , Qty 2, $160.00, NU (New), RT (Right) and LT (Left)  Hearing Aid Check: Binaural, 39961, $81.00  Dispensing Fee: Binaural, ,  $500.00  Fit/Orientation: Binaural, , $370.00  Hearing Aid Conformity Evaluation: 2, , $174.00  Hearing Aid Digital: Binaural, BTE,  $4475  Total: $5760     ASSESSMENT: binaural hearing aid fitting completed today. Verification measures were performed. The 45 day trial period was explained to patient, and they expressed understanding. Ms. Sowada signed the Hearing Aid Purchase Agreement and was given a copy, as well as details on her hearing aids. Patient was counseled that exact out of pocket amounts cannot be determined for hearing aid claims being sent to insurance. Any insurance coverage information presented to the patient is an estimate only, and is not a guarantee of payment. Patient has been advised to check with their own insurance.    PLAN: Ms. Sowada will return for follow-up in 2-3 weeks for a hearing aid review appointment. Please call this clinic with questions regarding today s appointment.    Darien Nair CCC-A  Licensed Audiologist #[id number]  12/17/2019

## 2019-12-17 NOTE — PATIENT INSTRUCTIONS

## 2019-12-18 ENCOUNTER — TELEPHONE (OUTPATIENT)
Dept: AUDIOLOGY | Facility: CLINIC | Age: 74
End: 2019-12-18

## 2019-12-18 NOTE — TELEPHONE ENCOUNTER
Discussed methods for better hearing while using the phone. If she is unsuccessful we will work on this at our next appointment.     -Darien ZABALAA

## 2020-01-09 ENCOUNTER — OFFICE VISIT (OUTPATIENT)
Dept: AUDIOLOGY | Facility: CLINIC | Age: 75
End: 2020-01-09
Payer: MEDICARE

## 2020-01-09 DIAGNOSIS — H90.3 SENSORINEURAL HEARING LOSS, BILATERAL: Primary | ICD-10-CM

## 2020-01-09 PROCEDURE — 99207 ZZC NO CHARGE LOS: CPT | Performed by: AUDIOLOGIST

## 2020-01-09 PROCEDURE — V5299 HEARING SERVICE: HCPCS | Performed by: AUDIOLOGIST

## 2020-01-09 NOTE — PROGRESS NOTES
AUDIOLOGY REPORT    SUBJECTIVE:Patricia A Sowada is a 74 year old female who was seen in the Audiology Clinic at the [facility] on 1/9/2020  for a follow-up check regarding the fitting of new hearing aids. Previous results have revealed bilateral sensorineural hearing loss.  The patient has been seen previously in this clinic and was fit with binaural hearing aids on 12/17/2019.  Myla reports background noise too loud and not feeling like herself with the hearing aids on. Patient was unaccompanied to today's visit.       OBJECTIVE:   After discussion it was determined that Myla is not ready to accept hearing aid usage. Myla returned the hearing aids and will drop off the  later today. Patient will be refunded the purchase of the hearing aids.      ASSESSMENT: A follow-up appointment for hearing aid fitting was completed today. Changes to hearing aid was completed as outlined above.     PLAN:Myla will return when she is ready to work with hearing aids. Please call this clinic with any questions regarding today s appointment.    Darien Nair CCC-A  Licensed Audiologist #[id number]  1/9/2020

## 2020-02-15 NOTE — TELEPHONE ENCOUNTER
"Requested Prescriptions   Pending Prescriptions Disp Refills     hydrochlorothiazide (MICROZIDE) 12.5 MG capsule [Pharmacy Med Name: HYDROCHLOROTHIAZIDE 12.5 MG CP]  Last Written Prescription Date:  2/22/2019  Last Fill Quantity: 90 capsule,  # refills: 1   Last office visit: 8/20/2018 with prescribing provider:  YEVGENIY Medina   Future Office Visit:     90 capsule 1     Sig: TAKE 1 CAPSULE BY MOUTH EVERY DAY       Diuretics (Including Combos) Protocol Failed - 7/29/2019  1:17 PM        Failed - Blood pressure under 140/90 in past 12 months     BP Readings from Last 3 Encounters:   05/10/19 142/84   01/16/19 145/85   10/10/18 137/78           Passed - Recent (12 mo) or future (30 days) visit within the authorizing provider's specialty     Patient had office visit in the last 12 months or has a visit in the next 30 days with authorizing provider or within the authorizing provider's specialty.  See \"Patient Info\" tab in inbasket, or \"Choose Columns\" in Meds & Orders section of the refill encounter.              Passed - Medication is active on med list        Passed - Patient is age 18 or older        Passed - No active pregancy on record        Passed - Normal serum creatinine on file in past 12 months     Recent Labs   Lab Test 08/08/18  0952   CR 0.66              Passed - Normal serum potassium on file in past 12 months     Recent Labs   Lab Test 08/08/18  0952   POTASSIUM 5.0              Passed - Normal serum sodium on file in past 12 months     Recent Labs   Lab Test 08/08/18  0952                 Passed - No positive pregnancy test in past 12 months          " Pt. Is to start enbrel

## 2020-04-19 DIAGNOSIS — I10 HYPERTENSION GOAL BP (BLOOD PRESSURE) < 140/90: ICD-10-CM

## 2020-04-20 NOTE — TELEPHONE ENCOUNTER
"Requested Prescriptions   Pending Prescriptions Disp Refills     hydrochlorothiazide (MICROZIDE) 12.5 MG capsule [Pharmacy Med Name: HYDROCHLOROTHIAZIDE 12.5 MG CP]  Last Written Prescription Date:  9/24/2019  Last Fill Quantity: 90 caps,  # refills: 1   Last office visit: 11/25/2019 with prescribing provider:  Adam   Future Office Visit:   90 capsule 1     Sig: TAKE 1 CAPSULE BY MOUTH EVERY DAY       Diuretics (Including Combos) Protocol Passed - 4/19/2020  9:13 AM        Passed - Blood pressure under 140/90 in past 12 months     BP Readings from Last 3 Encounters:   11/25/19 124/72   09/24/19 138/86   05/10/19 142/84                 Passed - Recent (12 mo) or future (30 days) visit within the authorizing provider's specialty     Patient has had an office visit with the authorizing provider or a provider within the authorizing providers department within the previous 12 mos or has a future within next 30 days. See \"Patient Info\" tab in inbasket, or \"Choose Columns\" in Meds & Orders section of the refill encounter.              Passed - Medication is active on med list        Passed - Patient is age 18 or older        Passed - No active pregancy on record        Passed - Normal serum creatinine on file in past 12 months     Recent Labs   Lab Test 09/24/19 0928   CR 0.77              Passed - Normal serum potassium on file in past 12 months     Recent Labs   Lab Test 09/24/19 0928   POTASSIUM 4.9                    Passed - Normal serum sodium on file in past 12 months     Recent Labs   Lab Test 09/24/19 0928                 Passed - No positive pregnancy test in past 12 months            "

## 2020-04-21 RX ORDER — HYDROCHLOROTHIAZIDE 12.5 MG/1
CAPSULE ORAL
Qty: 90 CAPSULE | Refills: 1 | Status: SHIPPED | OUTPATIENT
Start: 2020-04-21 | End: 2020-09-28

## 2020-04-21 NOTE — TELEPHONE ENCOUNTER
Prescription approved per [facility] Refill Protocol.    Bharati Vuong, Pharm.D., [facility]  Medication Therapy Management Pharmacist  [phone]

## 2020-09-01 ENCOUNTER — TRANSFERRED RECORDS (OUTPATIENT)
Dept: HEALTH INFORMATION MANAGEMENT | Facility: CLINIC | Age: 75
End: 2020-09-01

## 2020-09-28 DIAGNOSIS — I10 HYPERTENSION GOAL BP (BLOOD PRESSURE) < 140/90: ICD-10-CM

## 2020-09-28 RX ORDER — HYDROCHLOROTHIAZIDE 12.5 MG/1
CAPSULE ORAL
Qty: 90 CAPSULE | Refills: 0 | Status: SHIPPED | OUTPATIENT
Start: 2020-09-28

## 2020-09-28 NOTE — TELEPHONE ENCOUNTER
Routing refill request to provider for review/approval because:  Failed labs: creatinine, potassium, sodium       Ashlie Siegel RN

## 2020-10-02 ENCOUNTER — TELEPHONE (OUTPATIENT)
Dept: FAMILY MEDICINE | Facility: CLINIC | Age: 75
End: 2020-10-02

## 2020-10-02 ENCOUNTER — TELEPHONE (OUTPATIENT)
Dept: FAMILY MEDICINE | Facility: CLINIC | Age: 75
End: 2020-10-02
Payer: MEDICARE

## 2020-10-02 NOTE — TELEPHONE ENCOUNTER
Reason for Call:  Other / Patient passed    Detailed comments: Joceline, patient's daughter, called and requested to speak to patient's PCP regarding the fact patient passed.  Joceline also stated she would like to speak directly to PCP because talking to the  they do not wish to speculate, but they think it could have been a heart attack, and being patient had heart issues they would like to speak to PCP because she will be signing the death certificate.  Please call Joceline back at [phone] and it is okay to leave a message.    Phone Number Patient can be reached at: Home number on file [phone] (home)    Best Time: ASAP    Can we leave a detailed message on this number? YES    Call taken on 10/2/2020 at 4:18 PM by Colleen Tomas

## 2020-10-02 NOTE — TELEPHONE ENCOUNTER
Reason for Call:  Other Death Certificate    Detailed comments: Caller is Ct Ruvalcaba from the [facility] is calling to report that the patient was found dead in her home.  There was nothing suspicious in the home and states that the patient probably  of natural causes.  No autopsy will be performed.  They will be sending a death certificate to be signed by Dr Medina.  They Family might be getting a private autopsy done by the [facility].  If they do go through [facility], [place] will sign the death certificate.  No decision was made by the family at the time of the message    Phone Number Caller can be reached at: No call back needed FYI message    Best Time:     Can we leave a detailed message on this number? Not Applicable .  Call taken on 10/2/2020 at 2:58 PM by Rand Tobni

## 2020-10-02 NOTE — TELEPHONE ENCOUNTER
Patient has appointment scheduled.    Thank you!    Maira VUONG  [facility] Referral Rep

## 2020-10-06 NOTE — TELEPHONE ENCOUNTER
Daughter Emilie Linares, called in regarding message that was put in last week. She is hoping to speak with Dr. Medina regarding patients passing and what kind of medications she may have been taking.  Family is having an autopsy done.     Emilie Phone: [phone]    Thank you,  Tamiko Wu  Patient Rep.  [facility]

## 2020-10-06 NOTE — TELEPHONE ENCOUNTER
Called daughter , she had many questions regarding he last visit, a year ago(spoke to her for 30 min)    Joe autopsy done, daughter and family want to make sure everything was ok.   4 children live in [place] of [place]. Trying to figure it out.      Phone number ean-Dr Dayami Reid     [phone]      Called above number to discuss case with corner and left message    Trae Medina D.O.

## 2021-06-19 NOTE — LETTER
[facility] [place]  [address] [place] [address]  [phone]  Dept: [phone]        2017        Patricia A Sowada  [address] [address]          Dear Myla,      SULTANA RESULTS:     The results of your recent  ECHO stress test was NORMAL.  If you have any further questions or problems, please contact our office.      Sincerely,      Jagdish Delatorre DO/hl    Results for orders placed or performed in visit on 17   Echo stress test with definity    Narrative    [id number]  [id number]  [id number]  [id number]^NANDINI^JAGDISH^ROBERT           [facility], Echocardiography Laboratory  [address] [place] [id number]        Name: SOWADA, PATRICIA A  MRN: [id number]  : 1945  Study Date: 2017 10:54 AM  Age: 72 yrs  Gender: Female  Patient Location: [facility]  Reason For Study: , Essential (primary) hypertension, Left anterior fascicular  blo  Ordering Physician: JAGDISH DELATORRE  Referring Physician: JAGDISH DELATORRE  Performed By: Rosales Paredes RDCS     BSA: 1.9 m2  Height: 68 in  Weight: 171 lb  HR: 76  BP: 160/84 mmHg  _____________________________________________________________________________  __        Procedure  Stress Echo Bike with two dimensional, color and spectral Doppler performed.  Contrast Definity.  _____________________________________________________________________________  __        Interpretation Summary  Supine bicycle echo stress test  No angina symptoms with exercise.  Hypertensive blood pressure response to exercise (214/80 mmHg at peak  exercise)  No ECG evidence of ischemia.  No stress induced regional wall motion abnormalities.  Normal resting LV function with EF of approximately 60-65%; with exercise no  significant change in left ventricular cavity size and LVEF slightly lower at  55-60%. The reduced LV performance in this case could be as a result  of  elevated LVEDP from the hypertensive response.  Average functional capacity for age.  Normal biventricular function, mildly dilated ascending aorta and no  significant valvular dysfunction noted on screening 2D and Doppler  examination.  _____________________________________________________________________________  __     Stress  Definity (NDC #89222-176-04) given intravenously.  Patient was given 6ml mixture of 1.5ml Definity and 8.5ml saline.  4 ml wasted.  IV start location LAC .  There was no new ST segment depression.  Arrhythmia induced during stress: occasional PVC's.  Peak MVO2 23.1 ml/kg/min .  Percent predicted MVO2 103 %.  RPP 19396.  Maximum workload 125 finn.  Target Heart Rate was achieved.  The patient exhibited fatigue during exercise.  The patient did not exhibit any symptoms during exercise.     Rest  The baseline ECG displays normal sinus rhythm.     Stress Results     Protocol:  Supine Bike Stress Echo        Maximum Predicted HR:   146 bpm     Target HR: 124 bpm                        % Maximum Predicted HR: 89 %                             StageDurationHeart Rate  BP                                (mm:ss)   (bpm)                           Rest            76    160/84                           Peak  8:10      130   214/80                             Stress Duration:   8:10 mm:ss                       Maximum Stress HR: 130 bpm     Left Ventricle  Left ventricular systolic function is normal.     Aortic Valve  The aortic valve is normal in structure and function.     Mitral Valve  The mitral valve is normal in structure and function.        Tricuspid Valve  There is mild tricuspid regurgitation. Right ventricular systolic pressure is  normal.     Right Ventricle  The right ventricular systolic function is normal.     Vessels  Mildly dilated ascending aorta.     Pericardium  There is no pericardial effusion.      _____________________________________________________________________________  __  MMode/2D Measurements & Calculations  Ao root diam: 4.1 cm  asc Aorta Diam: 4.1 cm        Doppler Measurements & Calculations  Ao V2 max: 135.0 cm/sec  Ao max P.0 mmHg  PA V2 max: 68.0 cm/sec  PA max P.8 mmHg  TR max david: 245.7 cm/sec  TR max P.1 mmHg              _____________________________________________________________________________  __           Report approved by: Yogesh Ny 2017 12:31 PM                 yes

## 2021-06-28 ENCOUNTER — TELEPHONE (OUTPATIENT)
Dept: FAMILY MEDICINE | Facility: CLINIC | Age: 76
End: 2021-06-28

## 2021-06-28 NOTE — LETTER
[facility]  [address] [address] [phone]                                                                                                July 27, 2021    Patricia A Sowada  [address] [address]        Dear Ms. Sowada,    At [facility] we care about your health and well-being. A review of your chart has indicated that you are due for a(n) Colonoscopy or yearly stool blood testing (FIT), complete physical exam and blood pressure check. Please contact us at [phone] to schedule your appointment.     If you have already had one or all of the above screening tests at another facility, please call us to update your chart.     You may contact the clinic at [phone] if you have any questions or concerns about this request.      Sincerely,      Your Care Team

## 2021-06-28 NOTE — TELEPHONE ENCOUNTER
Patient Quality Outreach      Summary:    Patient has the following on her problem list/HM:     Hypertension   Last three blood pressure readings:  BP Readings from Last 3 Encounters:   11/25/19 124/72   09/24/19 138/86   05/10/19 142/84     Blood pressure: Passed    HTN Guidelines:  ? 139/89     Patient is due/failing the following:   Hypertension follow-up visit, Colonoscopy and Annual wellness, date due: 9/24/2020    Type of outreach:    Phone, left message for patient/parent to call back.    Questions for provider review:    None                                                                                                                                     Bharati Palacios MA       Chart routed to Care Team.

## 2021-07-20 NOTE — TELEPHONE ENCOUNTER
Patient Quality Outreach 2nd Attempt      Summary:    Type of outreach:    Phone, left message for patient/parent to call back.    Next Steps:  Reach out within 90 days via Letter.    Max number of attempts reached: Yes. Will try again in 90 days if patient still on fail list.    Questions for provider review:    None                                                                                                                    Bharati Palacios MA       Chart routed to Care Team.

## 2021-08-24 ENCOUNTER — TELEPHONE (OUTPATIENT)
Dept: FAMILY MEDICINE | Facility: CLINIC | Age: 76
End: 2021-08-24

## 2021-08-24 NOTE — TELEPHONE ENCOUNTER
Called daughter, unable to leave a message. Please reach out to her daughter (Joceline [phone]) to help her understand that it was not the provider contacting the patient.  I was aware of her passing as I did the certificate and  as we had talked in October.  It was a system error and now it has been corrected and should not be getting calls or messages.  I truly apologize for this inconvenience     Trae Medina D.O.

## 2021-08-24 NOTE — TELEPHONE ENCOUNTER
I already was notified in October.  There is no alert that she is passed away so my team is still reaching out to patient to come in.      I had many conversations with daughter so I am sure daughter is contacting us becausewe keep reaching out to her mother who is passed away. .  Is there anyone in management that can flag this chart as   Trae Medina D.O.

## 2021-08-24 NOTE — TELEPHONE ENCOUNTER
Daughter Joceline is calling to report that her mother has passed away. Writer did not get any further information, as the call came from the call center.       SUSII to provider    Smitha Ortiz/  Rob Deras

## 2021-08-24 NOTE — TELEPHONE ENCOUNTER
Called daughter, Joceline, and her voicemail was full so was not able to leave a voicemail message.    SAMIR Ny  [facility]

## 2021-08-25 NOTE — TELEPHONE ENCOUNTER
Called daughter, Joceline, and her voicemail was full so was not able to leave a voicemail message.    SAMIR Ny  [facility]

## 2021-08-26 NOTE — TELEPHONE ENCOUNTER
"Called daughter, Joceline, and relayed Dr Medina's message below.  Daughter is going to call back and leave a message with a  as to where the \"celebration of life\" is going to be. It will be on 10/2/21 in [place] but they are still working on the actual site for the \"celebration of life\".    Denise Simental [facility]    "